# Patient Record
Sex: MALE | Race: WHITE
[De-identification: names, ages, dates, MRNs, and addresses within clinical notes are randomized per-mention and may not be internally consistent; named-entity substitution may affect disease eponyms.]

---

## 2019-08-23 ENCOUNTER — HOSPITAL ENCOUNTER (INPATIENT)
Dept: HOSPITAL 11 - JP.MS | Age: 59
LOS: 6 days | Discharge: NURSING, REHAB OR LONG-TERM CARE | DRG: 326 | End: 2019-08-29
Attending: SURGERY | Admitting: SURGERY
Payer: OTHER GOVERNMENT

## 2019-08-23 DIAGNOSIS — R22.2: ICD-10-CM

## 2019-08-23 DIAGNOSIS — K55.9: ICD-10-CM

## 2019-08-23 DIAGNOSIS — I25.10: ICD-10-CM

## 2019-08-23 DIAGNOSIS — Z88.0: ICD-10-CM

## 2019-08-23 DIAGNOSIS — E66.01: ICD-10-CM

## 2019-08-23 DIAGNOSIS — I10: ICD-10-CM

## 2019-08-23 DIAGNOSIS — Z88.1: ICD-10-CM

## 2019-08-23 DIAGNOSIS — Z95.0: ICD-10-CM

## 2019-08-23 DIAGNOSIS — K90.9: ICD-10-CM

## 2019-08-23 DIAGNOSIS — K29.70: ICD-10-CM

## 2019-08-23 DIAGNOSIS — F32.9: ICD-10-CM

## 2019-08-23 DIAGNOSIS — K56.51: ICD-10-CM

## 2019-08-23 DIAGNOSIS — K31.6: ICD-10-CM

## 2019-08-23 DIAGNOSIS — Z87.891: ICD-10-CM

## 2019-08-23 DIAGNOSIS — K65.1: ICD-10-CM

## 2019-08-23 DIAGNOSIS — M16.11: ICD-10-CM

## 2019-08-23 DIAGNOSIS — E78.5: ICD-10-CM

## 2019-08-23 DIAGNOSIS — Z86.73: ICD-10-CM

## 2019-08-23 DIAGNOSIS — K21.9: ICD-10-CM

## 2019-08-23 DIAGNOSIS — L02.211: ICD-10-CM

## 2019-08-23 DIAGNOSIS — Z90.49: ICD-10-CM

## 2019-08-23 DIAGNOSIS — Z95.1: ICD-10-CM

## 2019-08-23 DIAGNOSIS — Z98.890: ICD-10-CM

## 2019-08-23 DIAGNOSIS — Z79.899: ICD-10-CM

## 2019-08-23 DIAGNOSIS — Z95.5: ICD-10-CM

## 2019-08-23 DIAGNOSIS — G47.30: ICD-10-CM

## 2019-08-23 DIAGNOSIS — J45.909: ICD-10-CM

## 2019-08-23 DIAGNOSIS — Z98.84: ICD-10-CM

## 2019-08-23 DIAGNOSIS — G89.29: ICD-10-CM

## 2019-08-23 DIAGNOSIS — K28.5: Primary | ICD-10-CM

## 2019-08-23 PROCEDURE — C9113 INJ PANTOPRAZOLE SODIUM, VIA: HCPCS

## 2019-08-23 NOTE — CRLCR
INDICATION: Evaluate fistula post gastric bypass surgery



Comparison:



CT Abdomen/Pelvis 8/23/2019



Prelim: 



Oral contrast seen in distal esophagus, proximal stomach, likely the fundal 

pouch, and small bowel.  Small amount of contrast extends to the left 

lateral side of the stomach which may represent a fistula into the excluded 

stomach.



Agree with preliminary interpretation.  No additional findings.



Dictated by: Partha Diggs MD @ 08/28/2019 20:10:24



(Electronically Signed)

## 2019-08-24 PROCEDURE — 0DB68ZX EXCISION OF STOMACH, VIA NATURAL OR ARTIFICIAL OPENING ENDOSCOPIC, DIAGNOSTIC: ICD-10-PCS | Performed by: SURGERY

## 2019-08-24 RX ADMIN — MAGNESIUM SULFATE IN WATER SCH MLS/HR: 40 INJECTION, SOLUTION INTRAVENOUS at 22:12

## 2019-08-24 RX ADMIN — SODIUM CHLORIDE SCH MLS/HR: 9 INJECTION, SOLUTION INTRAVENOUS at 17:08

## 2019-08-24 RX ADMIN — MAGNESIUM SULFATE IN WATER SCH MLS/HR: 40 INJECTION, SOLUTION INTRAVENOUS at 11:25

## 2019-08-24 RX ADMIN — SODIUM CHLORIDE SCH MLS/HR: 9 INJECTION, SOLUTION INTRAVENOUS at 14:31

## 2019-08-24 RX ADMIN — SODIUM CHLORIDE SCH MLS/HR: 9 INJECTION, SOLUTION INTRAVENOUS at 11:24

## 2019-08-24 RX ADMIN — MAGNESIUM SULFATE IN WATER SCH MLS/HR: 40 INJECTION, SOLUTION INTRAVENOUS at 17:08

## 2019-08-25 PROCEDURE — 0FB20ZZ EXCISION OF LEFT LOBE LIVER, OPEN APPROACH: ICD-10-PCS | Performed by: SURGERY

## 2019-08-25 PROCEDURE — 0DBU0ZX EXCISION OF OMENTUM, OPEN APPROACH, DIAGNOSTIC: ICD-10-PCS | Performed by: SURGERY

## 2019-08-25 PROCEDURE — 0D150ZA BYPASS ESOPHAGUS TO JEJUNUM, OPEN APPROACH: ICD-10-PCS | Performed by: SURGERY

## 2019-08-25 PROCEDURE — 0DB60ZZ EXCISION OF STOMACH, OPEN APPROACH: ICD-10-PCS | Performed by: SURGERY

## 2019-08-25 PROCEDURE — 0D960ZZ DRAINAGE OF STOMACH, OPEN APPROACH: ICD-10-PCS | Performed by: SURGERY

## 2019-08-25 PROCEDURE — 3E0M05Z INTRODUCTION OF ADHESION BARRIER INTO PERITONEAL CAVITY, OPEN APPROACH: ICD-10-PCS | Performed by: SURGERY

## 2019-08-25 PROCEDURE — 0DNW0ZZ RELEASE PERITONEUM, OPEN APPROACH: ICD-10-PCS | Performed by: SURGERY

## 2019-08-25 PROCEDURE — 0DB80ZZ EXCISION OF SMALL INTESTINE, OPEN APPROACH: ICD-10-PCS | Performed by: SURGERY

## 2019-08-25 PROCEDURE — 0D9A0ZZ DRAINAGE OF JEJUNUM, OPEN APPROACH: ICD-10-PCS | Performed by: SURGERY

## 2019-08-25 RX ADMIN — MAGNESIUM SULFATE IN WATER SCH MLS/HR: 40 INJECTION, SOLUTION INTRAVENOUS at 17:21

## 2019-08-25 RX ADMIN — FENTANYL CITRATE ONE MCG: 50 INJECTION, SOLUTION INTRAMUSCULAR; INTRAVENOUS at 15:00

## 2019-08-25 RX ADMIN — MAGNESIUM SULFATE IN WATER SCH MLS/HR: 40 INJECTION, SOLUTION INTRAVENOUS at 22:34

## 2019-08-25 RX ADMIN — FENTANYL CITRATE ONE MCG: 50 INJECTION, SOLUTION INTRAMUSCULAR; INTRAVENOUS at 14:53

## 2019-08-25 RX ADMIN — MAGNESIUM SULFATE IN WATER SCH MLS/HR: 40 INJECTION, SOLUTION INTRAVENOUS at 04:15

## 2019-08-25 RX ADMIN — ACETAMINOPHEN SCH: 650 SOLUTION ORAL at 18:08

## 2019-08-25 RX ADMIN — LIDOCAINE HYDROCHLORIDE ANHYDROUS AND DEXTROSE MONOHYDRATE SCH MLS/HR: .4; 5 INJECTION, SOLUTION INTRAVENOUS at 14:54

## 2019-08-25 RX ADMIN — MAGNESIUM SULFATE IN WATER SCH: 40 INJECTION, SOLUTION INTRAVENOUS at 15:06

## 2019-08-26 RX ADMIN — PANTOPRAZOLE SODIUM SCH: 40 GRANULE, DELAYED RELEASE ORAL at 16:28

## 2019-08-26 RX ADMIN — DIVALPROEX SODIUM SCH MG: 250 TABLET, DELAYED RELEASE ORAL at 20:32

## 2019-08-26 RX ADMIN — MAGNESIUM SULFATE IN WATER SCH MLS/HR: 40 INJECTION, SOLUTION INTRAVENOUS at 05:14

## 2019-08-26 RX ADMIN — MAGNESIUM SULFATE IN WATER SCH MLS/HR: 40 INJECTION, SOLUTION INTRAVENOUS at 17:31

## 2019-08-26 RX ADMIN — GABAPENTIN SCH MG: 250 SOLUTION ORAL at 20:44

## 2019-08-26 RX ADMIN — LIDOCAINE HYDROCHLORIDE ANHYDROUS AND DEXTROSE MONOHYDRATE SCH MLS/HR: .4; 5 INJECTION, SOLUTION INTRAVENOUS at 10:07

## 2019-08-26 RX ADMIN — ACETAMINOPHEN SCH MG: 650 SOLUTION ORAL at 12:11

## 2019-08-26 RX ADMIN — GABAPENTIN SCH MG: 250 SOLUTION ORAL at 10:03

## 2019-08-26 RX ADMIN — MAGNESIUM SULFATE IN WATER SCH MLS/HR: 40 INJECTION, SOLUTION INTRAVENOUS at 10:12

## 2019-08-26 RX ADMIN — ACETAMINOPHEN SCH: 650 SOLUTION ORAL at 09:57

## 2019-08-26 RX ADMIN — GABAPENTIN SCH MG: 250 SOLUTION ORAL at 15:00

## 2019-08-26 RX ADMIN — SODIUM CHLORIDE, SODIUM LACTATE, POTASSIUM CHLORIDE, AND CALCIUM CHLORIDE SCH MLS/HR: .6; .31; .03; .02 INJECTION, SOLUTION INTRAVENOUS at 17:32

## 2019-08-26 RX ADMIN — ACETAMINOPHEN SCH MG: 650 SOLUTION ORAL at 17:32

## 2019-08-26 RX ADMIN — Medication SCH: at 10:03

## 2019-08-26 RX ADMIN — ACETAMINOPHEN SCH: 650 SOLUTION ORAL at 00:15

## 2019-08-26 RX ADMIN — MAGNESIUM SULFATE IN WATER SCH MLS/HR: 40 INJECTION, SOLUTION INTRAVENOUS at 23:57

## 2019-08-26 NOTE — PN
DATE OF SERVICE:  08/26/2019

 

SUBJECTIVE:  Morteza is postoperative day #1.  His pain has been controlled.  He has been quite

sleepy.  Upper GI this morning showed no evidence of leak of the oral administrated

contrast.  Vital signs stable.  Oral intake; sips of clear liquids.  Keen; 2775 mL out.  PAMELA

drain 1 put out 205 mL of a light red drainage and PAMELA drain put out 25 mL of a light pink

drainage.

 

REVIEW OF SYSTEMS:  Remainder of review of systems negative for any pertinent positives and

negatives.

 

OBJECTIVE:  GENERAL:  Morteza Campbell is a 59-year-old male who is postoperative day #1,

extremely sleepy.

VITAL SIGNS:  TPR is 98.1, 60, 16, blood pressure is 124/54.

HEENT:  Negative.

NECK:  Supple.

HEART:  Regular rate and rhythm.

LUNGS:  Clear.

ABDOMEN:  Dressing dry and intact.  PAMELA drains as above.

EXTREMITIES:  Without peripheral edema.  SCDs are on.

 

ASSESSMENT:  Exploratory laparotomy with:

1. Esophagogastrectomy entire stomach (with Judi-en-Y esophagojejunostomy).

2. En bloc partial left hepatic lobectomy.

3. Drainage of perigastric abscess.

4. Small-bowel resection.

5. Near complete omentectomy.

6. Placement of Interceed mesh.

7. Biopsy of peritoneal nodule on surface of omentum.

 

POSTOPERATIVE DIAGNOSES:

1. Perforated ulcer at the gastrojejunostomy adherent to left lobe of liver with contained

    perigastric abscess.

2. Gastrogastric fistula.

3. Peritoneal nodule over omentum.

4. Ischemic omentum status post gastrectomy.

 

Date of surgery:  08/25/2019.  Surgeon:  Elliott Joe MD.

 

PLAN:

1. Decrease IV to 100 mL per hour at 12 noon.

2. Advance diet to step 2 at noon.

3. Discontinue Keen catheter.

4. Medication restarted is Depakote 500 mg at bedtime.  Nursing staff or pharmacy will

    check with the patient if he is

    still taking that.

5. Communication order written, 3 med cups per hour or one every 20 minutes.

6. Good pulmonary toilet.

7. We will evaluate p.r.n. or in a.m.

 

 

 

 

Esther Jay PA-C

DD:  08/26/2019 08:01:08

DT:  08/26/2019 09:41:36

Job #:  [id number]

## 2019-08-26 NOTE — CRLCR
INDICATION:



Evaluate Judi-en-Y gastric bypass.



COMPARISON:



Upper GI study dated 23 August 2019.



FINDINGS:



Two views of the abdomen show contrast in the gastric pouch and small 

bowel. Small amount of contrast may be located in the excluded stomach.



No extravasation of contrast. Drainage catheters in the upper abdomen. No 

evidence of free intraperitoneal air. Degenerative changes of the spine.



IMPRESSION:



1. No evidence of extravasation/leak of the orally administered contrast. 



2. Small amount of contrast may be located in the excluded stomach. 



Consider CT scan for more complete evaluation.



Dictated by Nacho Terry MD @ 8/26/2019 6:03:38 AM



Dictated by: Nacho Terry MD @ 08/26/2019 06:04:06



(Electronically Signed)

## 2019-08-27 RX ADMIN — GABAPENTIN SCH MG: 250 SOLUTION ORAL at 20:53

## 2019-08-27 RX ADMIN — ACETAMINOPHEN SCH MG: 650 SOLUTION ORAL at 06:18

## 2019-08-27 RX ADMIN — PANTOPRAZOLE SODIUM SCH MG: 40 GRANULE, DELAYED RELEASE ORAL at 16:37

## 2019-08-27 RX ADMIN — GABAPENTIN SCH MG: 250 SOLUTION ORAL at 09:35

## 2019-08-27 RX ADMIN — ACETAMINOPHEN SCH: 650 SOLUTION ORAL at 23:33

## 2019-08-27 RX ADMIN — ACETAMINOPHEN SCH: 650 SOLUTION ORAL at 00:00

## 2019-08-27 RX ADMIN — SODIUM CHLORIDE, SODIUM LACTATE, POTASSIUM CHLORIDE, AND CALCIUM CHLORIDE SCH MLS/HR: .6; .31; .03; .02 INJECTION, SOLUTION INTRAVENOUS at 16:29

## 2019-08-27 RX ADMIN — Medication SCH: at 09:36

## 2019-08-27 RX ADMIN — ACETAMINOPHEN SCH MG: 650 SOLUTION ORAL at 17:24

## 2019-08-27 RX ADMIN — MAGNESIUM SULFATE IN WATER SCH MLS/HR: 40 INJECTION, SOLUTION INTRAVENOUS at 06:16

## 2019-08-27 RX ADMIN — GABAPENTIN SCH MG: 250 SOLUTION ORAL at 16:28

## 2019-08-27 RX ADMIN — ACETAMINOPHEN SCH MG: 650 SOLUTION ORAL at 11:18

## 2019-08-27 RX ADMIN — DIVALPROEX SODIUM SCH MG: 250 TABLET, DELAYED RELEASE ORAL at 20:50

## 2019-08-27 NOTE — PN
DATE OF SERVICE:  08/27/2019

 

SUBJECTIVE:  Morteza is alert and talkative.  He reports his pain on a pain scale of 1 to 10 of

4/10.  He has been up ambulating.  Afebrile.  Oral intake is 950.  Urine output was 2050

plus 2 missed voids.  Having no difficulty urinating after Keen catheter was removed.  PAMELA

drains have put out 120 and 55 of a pink serosanguineous drainage, amount 75 and 50

respectively.

 

REVIEW OF SYSTEMS:  Remainder of review of systems negative for any pertinent positives and

negatives.

 

OBJECTIVE:  GENERAL:  Morteza Campbell is a 59-year-old male.  He is alert, orientated, and

talkative.

VITAL SIGNS:  TPR is 96.8, 62, 14, and blood pressure 146/61.

HEENT:  Negative.

NECK:  Supple.

HEART:  Regular rate and rhythm.

LUNGS:  Clear.

ABDOMEN:  Aquacel dressing is off, came off during his shower.  Staples intact.  Incisions

covered with 4x4, and abdominal binder is on.

EXTREMITIES:  Without peripheral edema.

 

ASSESSMENT:  Exploratory laparotomy with,

1. Esophagogastrectomy, entire stomach (with Judi-en-Y esophagojejunostomy).

2. En bloc partial left hepatic lobectomy.

3. Drainage of perigastric abscess.

4. Small bowel resection.

5. Placement of Interceed mesh.

6. Biopsy of peritoneal nodule on surface of omentum.

Postoperative Diagnoses:

1. Perforated ulcer of the gastrojejunostomy adherent to left lobe of liver with contained

    perigastric abscess.

2. Gastrogastric fistula.

3. Peritoneal nodule over omentum.

4. Ischemic omentum, status post gastrectomy.

 

PLAN:

1. Discontinue PCA.

2. Discontinue continuous pulse ox.

3. IV decreased to TKO, 60 mL per hour.

4. Dilaudid 2 to 4 mg every 4 hours p.r.n. pain.

5. Consult with Discharge Planning in regard to home health care.

6. Good pulmonary toilet.

7. Walking encouraged.

8. We will evaluate p.r.n. or in a.m.

 

 

 

 

Esther Jay PA-C

DD:  08/27/2019 07:35:15

DT:  08/27/2019 11:28:14

Job #:  [id number]

## 2019-08-28 RX ADMIN — GABAPENTIN SCH MG: 250 SOLUTION ORAL at 22:22

## 2019-08-28 RX ADMIN — ACETAMINOPHEN SCH: 650 SOLUTION ORAL at 05:13

## 2019-08-28 RX ADMIN — OXYCODONE HYDROCHLORIDE AND ACETAMINOPHEN PRN TAB: 5; 325 TABLET ORAL at 13:21

## 2019-08-28 RX ADMIN — OXYCODONE HYDROCHLORIDE AND ACETAMINOPHEN PRN TAB: 5; 325 TABLET ORAL at 09:06

## 2019-08-28 RX ADMIN — OXYCODONE HYDROCHLORIDE AND ACETAMINOPHEN PRN TAB: 5; 325 TABLET ORAL at 17:43

## 2019-08-28 RX ADMIN — PANTOPRAZOLE SODIUM SCH MG: 40 GRANULE, DELAYED RELEASE ORAL at 16:50

## 2019-08-28 RX ADMIN — DIVALPROEX SODIUM SCH MG: 250 TABLET, DELAYED RELEASE ORAL at 22:22

## 2019-08-28 RX ADMIN — GABAPENTIN SCH MG: 250 SOLUTION ORAL at 13:26

## 2019-08-28 RX ADMIN — GABAPENTIN SCH MG: 250 SOLUTION ORAL at 10:27

## 2019-08-28 RX ADMIN — OXYCODONE HYDROCHLORIDE AND ACETAMINOPHEN PRN TAB: 5; 325 TABLET ORAL at 22:22

## 2019-08-28 NOTE — OR
DATE OF PROCEDURE:  08/24/2019

 

PREOPERATIVE DIAGNOSIS:  Upper abdominal pain with probable gastrogastric fistula.

 

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

cm) and associated gastrogastric fistula.

 

OPERATIVE PROCEDURE:  Upper GI endoscopy with biopsy of gastric pouch for CLOtest.

 

ANESTHESIA:  IV sedation.

 

INDICATION FOR PROCEDURE:  Please see earlier dictated progress note.  Potential risks

including bleeding and perforation were discussed, and the patient wishes to proceed.

 

DETAILS OF PROCEDURE:  The patient was taken to the operating room, placed in a left lateral

decubitus position.  IV sedation was administered, after which the upper GI endoscope was

passed orally through the length of the esophagus and into the gastric pouch.  Gastric pouch

was noted to be markedly edematous, and there appeared to be areas of some granulation

tissues around the area of the gastrogastric fistula.  The pouch was markedly enlarged

measuring 12 cm from the gastrojejunostomy to the esophagogastric junction mucosal line, and

the gastrogastric fistula was easily visualized with scope being passed into the remainder

of the bypassed stomach without difficulty.  The remainder of the examination was otherwise

unremarkable.  The gastrojejunostomy itself was opened and the Judi limb otherwise once few

centimeters from the gastrojejunostomy becoming normal in appearance.  Biopsies were

obtained from the gastric pouch, sent for CLOtest for H. pylori.  Minimal bleeding from the

biopsy sites was seen and the procedure then concluded.  The patient was taken to the

recovery room in satisfactory condition.  There were no evident complications.

 

 

 

 

Elliott Joe MD

DD:  08/27/2019 07:46:47

DT:  08/28/2019 12:58:26

Job #:  [id number]

## 2019-08-28 NOTE — HP
This is a 59-year-old admitted overnight after being transferred from the [facility] with worsening upper abdominal pain.  The patient underwent a laparoscopic Judi-en-

Y gastric bypass in [place], [place] in 2017 and then in 2018.  He had a perforated ulcer at

the gastrojejunostomy.  He now presents with worsening epigastric pain despite ongoing

medical management.  Workup in [facility] was consistent with a CAT scan, which

suggested a gastrogastric fistula in addition to quite large amount of edema around the area

of the gastric pouch and gastrojejunostomy.

 

The patient's past medical history includes history of cardiac disease, but this appears to

be stable.  He is status post stent placement as well as a pacemaker placement, who has had

hypertension, hyperlipidemia, and there is no history of type 2 diabetes mellitus.

Otherwise, his other history is as per the [facility] notes.

 

The plan will be to proceed with upper GI endoscopy today to evaluate the patient's anatomy,

and we will most likely tomorrow need to revise the gastrojejunostomy with minimum division

of the shunt.  I suspect if this was the case, we are probably dealing with a large gastric

pouch with a large amount of acid output and more of a resectional procedure will probably

be appropriate to reduce the amount of pouch size.  There may be enough inflammation that we

in fact may have to divide proximally and more at the level of the esophagus.  Anyway, this

would be discussed with the patient after the upper endoscopy has been completed, which will

be undertaken later on this morning.

 

 

 

 

Elliott Joe MD

DD:  08/27/2019 07:44:51

DT:  08/28/2019 13:01:38

Job #:  [id number]

## 2019-08-28 NOTE — PN
DATE OF SERVICE:  08/28/2019

 

SUBJECTIVE:  Morteza states that he would like to have a change in pain medication.  The

Dilaudid brings his pain from a 6 to a 4 on a pain scale of 1-10.  He is sleeping well, up,

ambulating.  Vital signs have been stable, tolerating a step 2 diet well.  Oral intake 1230,

output 1600.  PAMELA drains put out 50 and 35 of a light red drainage respectively.  He has no

other concerns or questions.

 

OBJECTIVE:  GENERAL:  Morteaz Campbell is a 59-year-old male.  He is alert and orientated.

VITAL SIGNS:  TPR 96.9, 62, 16.  Blood pressure 158/78.

HEENT:  Negative.

NECK:  Supple.

HEART:  Regular rate and rhythm.

LUNGS:  Clear.

ABDOMEN:  Staples intact.  He has an ABD over the staple line.  PAMELA drains are intact in left

lower quadrant and abdominal binder has been on.

EXTREMITIES:  Without peripheral edema.

 

ASSESSMENT:  Exploratory laparotomy with:

1. Esophagogastrectomy, entire stomach (with Judi-en-Y esophagojejunostomy).

2. En bloc partial left hepatic lobectomy.

3. Drainage of perigastric abscess.

4. Small bowel resection.

5. Placement of Interceed mesh.

6. Biopsy of peritoneal nodule on surface of omentum.

 

POSTOPERATIVE DIAGNOSES:

1. Perforated ulcer at the gastrojejunostomy adherent to left lobe of the liver with

    contained perigastric abscesses.

2. Gastrogastric fistula.

3. Peritoneal nodule over omentum.

4. Ischemic omentum, status post gastrectomy.

 

PLAN:

1. Milk of Magnesia 30 mL 1 time.

2. Dulcolax tabs 1 hour after Milk of Magnesia.

3. Saline lock IV.

4. Percocet 5/325 mg 1 to 2 every 4 hours p.r.n. pain.

5. Discontinue Dilaudid.

6. Discontinue Tylenol.

7. Check for prior authorization needed for narcotic pain medication.

8. Discharge planning.  To check on home care.  Referral was made to [facility]

    and awaiting return phone call.

9. Microbiology.  Abdominal abscess showed E. coli, viridans, streptococcus, which is

    sensitive to the meropenem that he is on.

10.Good pulmonary toilet.

11.We will [facility] p.r.n. [facility]

DD:  08/28/2019 07:45:35

DT:  08/28/2019 10:35:15

Job #:  [id number]

## 2019-08-29 RX ADMIN — GABAPENTIN SCH MG: 250 SOLUTION ORAL at 14:36

## 2019-08-29 RX ADMIN — OXYCODONE HYDROCHLORIDE AND ACETAMINOPHEN PRN TAB: 5; 325 TABLET ORAL at 15:04

## 2019-08-29 RX ADMIN — GABAPENTIN SCH MG: 250 SOLUTION ORAL at 08:28

## 2019-08-29 RX ADMIN — OXYCODONE HYDROCHLORIDE AND ACETAMINOPHEN PRN TAB: 5; 325 TABLET ORAL at 06:03

## 2019-08-29 RX ADMIN — PANTOPRAZOLE SODIUM SCH MG: 40 GRANULE, DELAYED RELEASE ORAL at 16:05

## 2019-08-29 RX ADMIN — OXYCODONE HYDROCHLORIDE AND ACETAMINOPHEN PRN TAB: 5; 325 TABLET ORAL at 02:47

## 2019-08-29 NOTE — PN
DATE OF SERVICE:  08/29/2019

 

SUBJECTIVE:  Morteza's vital signs have been stable.  His pain has been managed.  His pain

medication was increased where he is getting Percocet 1 to 2 tabs every 4 hours with

Vistaril 100 mg IM every 4 hours or the oral form of hydroxyzine, atarax 50 mg every 4 hours

that they have been trying to end with alternating it. It has managed his pain.  He has been

up, ambulating.  Oral intake 1230.  Urine output 1600.  PAMELA drains have put out 30 and 70

respectively of a light pink drainage, and he has had 2 bowel movements.

 

REVIEW OF SYSTEMS:  Remainder of review of systems negative for any pertinent positives and

negatives.

 

OBJECTIVE:  GENERAL:  Morteza Campbell is a 59-year-old male.  He is alert and orientated.

VITAL SIGNS:  TPR is 97.1, 65, 18.  Blood pressure 149/77.

HEENT:  Negative.

NECK:  Supple.

HEART:  Regular rate and rhythm.

LUNGS:  Clear.

ABDOMEN:  Staples intact.  Abdominal binder is on.

EXTREMITIES:  Without peripheral edema.

 

ASSESSMENT:

1. Exploratory laparotomy with esophagogastrectomy, entire stomach with Judi-en-Y

    esophagojejunostomy.

2. En bloc partial left hepatic lobectomy.

3. Drainage of perigastric abscess.

4. Small bowel resection.

5. Placement of Interceed mesh.

6. Biopsy of peritoneal nodule on surface of omentum.

 

PLAN:  Continue with same orders.  Discharge pending nursing home, rehabilitation center.

We will evaluate p.r.n. and plan discharge accordingly.

 

 

 

 

Esther Jay PA-C

DD:  08/29/2019 07:53:28

DT:  08/29/2019 10:29:15

Job #:  [id number]

## 2019-08-29 NOTE — DISCH
ADMISSION DIAGNOSES:

1. Partial small bowel obstruction.

2. Gastroesophageal reflux disease.

3. Status post Judi-en-Y gastric bypass surgery.

4. Unspecified surgical malabsorption.

5. Coronary artery disease, status post percutaneous coronary angioplasty.

6. Depression.

7. Hypertension.

8. Status post angioplasty with stent.

9. Cardiac dysrhythmia.

10.Hyperlipidemia.

11.Sleep apnea.

12.Asthma.

13.Cerebral artery occlusion with cerebral infarction (stroke).

14.History of gastric ulcer with perforation.

15.Pacemaker for bradycardia.

 

DISCHARGE DIAGNOSES:

1. Upper GI endoscopy with biopsies of gastric pouch for CLOtest for severe pouch

    gastritis with markedly enlarged gastric pouch and associated perigastric fistula.

    Date of surgery, 08/24/2019.

2. Exploratory laparotomy with:

    a.     Esophagogastrectomy, entire stomach (with Judi-en-Y esophagojejunostomy).

    b.     En bloc partial left hepatic lobectomy.

    c.     Drainage of perigastric abscess.

    d.     Small bowel resection.

    e.     Placement of Interceed mesh.

    f.     Biopsy of peritoneal nodule on surface of omentum.

 

POSTOPERATIVE DIAGNOSES:

1. Perforated ulcer at the gastrojejunostomy adherent to left lobe of the liver with

    contained perigastric abscess.

2. Gastrogastric fistula.

3. Peritoneal nodule over omentum.

4. Ischemic omentum, status post gastrectomy.

 

Date of surgery, 08/25/2019.  Surgeon, Elliott Joe M.D.

 

HISTORY:  Morteza Campbell is a 59-year-old male, who presented to [facility] after

being seen in the emergency room in [facility].  He was transferred with

increase in upper abdominal pain.  He had a laparoscopic Judi-en-Y gastric bypass surgery,

in [place], [place] in 2017 and then again in 2018.  He had a perforated ulcer at the

gastrojejunostomy.  He had a workup in [facility] with a CAT scan, which

suggested a gastrogastric fistula in addition to a large amount of edema around the area of

the gastric pouch and gastrojejunostomy.  He was transferred on 08/23/2019.  He had an upper

GI endoscopy on 08/24/2019 and surgery on 08/25/2019.  He had no operative complications.

On postop day #1, upper GI was normal.  His IV was decreased to 100 mL per hour.  He was

started on step 2 gastric bypass diet with no cereal at noon.  His Keen catheter was

discontinued.  Home medications were started.  On postop day #2, his PCA was discontinued.

He was started on oral pain medication.  Discharge Planning was consulted in regard to his

discharge.  On postop day #3, he was given milk of magnesia followed by Dulcolax tabs 1 hour

for bowel stimulation.  His Dilaudid was not quite adequate for pain coverage.  He was

changed to Percocet every 4 hours with Vistaril, hydroxyzine, altered every 2 hours.  On

08/29/2019, he was ready to be discharged.  Oral intake was adequate.  Vital signs were

stable.  Activity good, and he was having bowel movements.  Incision looks good.

 

REVIEW OF SYSTEMS:  He did negative for any pertinent positives and negatives.  He received

dietary instruction.

 

PHYSICAL EXAMINATION:

GENERAL:  Morteza is a 59-year-old male, alert, orientated.

VITAL SIGNS:  Height is 5 feet 8.11 inches, weight is 214 pounds, BMI is 32.  TPR; 97.1, 65,

18.  Blood pressure 149/77.

HEENT:  Negative.

NECK:  Supple.

HEART:  Regular rate and rhythm.

LUNGS:  Clear.

ABDOMEN:  [place] intact.  PAMELA drains will be removed prior to discharge, and abdominal

binder has been on.

EXTREMITIES:  Without peripheral edema.

 

DISPOSITION:  [facility]

 

CONDITION:  Stable and improving.

 

FOLLOWUP:  Followup appointment with Esther Jay PA-C, 09/06/2019, at 10:00 a.m.

 

HOME MEDICATIONS:

1. Percocet 5/325 mg 1 to 2 every 4 hours p.r.n. pain, #42.

2. Celebrex 200 mg oral daily, #14.

3. Hydroxyzine 50 mg oral q.4 hours alternating with Percocet for pain, #42.

 

He is to resume his home medications of:

1. Carvedilol, Coreg, 25 mg oral twice daily.

2. Divalproex sodium 500 mg oral at bedtime.

3. Doxazosin, Cardura, 8 mg at bedtime.

4. Enalapril maleate, Vasotec, 40 mg oral twice daily.

5. Proscar 5 mg oral daily.

6. Neurontin 600 mg oral 3 times a day.

7. Lisinopril 20 mg oral daily.

8. Antivert 12.5 mg oral daily p.r.n. dizziness.

9. Omeprazole 20 mg oral twice daily.

10.Zofran 4 mg every 8 hours p.r.n. nausea.

11.Potassium chloride 20 mEq oral daily.

12.Ranitidine 150 mg oral twice daily.

13.Sucralfate 1 g oral 4 times a day.

14.Amlodipine 5 mg oral daily.

15.Lipitor 40 mg oral at bedtime.

16.Ropinirole 2 mg oral twice daily.

 

He is to discontinue taking Norco, ascorbic acid, vitamin D3, and ferrous gluconate until

after first postop appointment.

 

DIET:  Step 2 gastric bypass diet with no cereal until 09/09/2019.  Drink 8 to 10 glasses of

water a day.

 

ACTIVITY:  No lifting greater than 10 pounds for 6 weeks.

 

OTHER ACTIVITY:  Walk at least 6 times daily, distance and time as tolerated.

 

DRIVING:  Do not drive for 1 week and while on pain medication.

 

SHOWER/BATHING:  May shower.  No tub bathing or swimming.

 

Notify provider if any fever, increased pain, nausea, or vomiting.  Keep site clean and dry.

Wear abdominal binder for 6 weeks.

 

SPECIAL INSTRUCTION:  Use incentive spirometer 10 times every hour while awake.

## 2019-08-29 NOTE — PN
DATE OF SERVICE:  08/24/2019

 

The patient underwent upper endoscopy earlier today, and then several hours later, I

discussed the findings with him.  Plan at this point will be to proceed with open

laparotomy, as his secondary procedure for perforation in 2018 was done with an open

approach, and he has a retrocolic Judi limb, all more or less mandating an open approach for

tomorrow's operation.  The patient has a strikingly enlarged gastric pouch and will need to

have this entire area resected, including the gastrogastric fistula.  We may end up having

to go higher up onto the pouch and more under the distalmost esophagus to get into a clean

plane for safe anastomosis, and otherwise whatever resectional procedure will be

reconstructed with Judi-en-Y esophagogastrojejunostomy.  Potential risks of the procedure

including bleeding, infection, leaks from various GI tract closures, problems with bowel

obstruction overtime were all reviewed, and the patient wishes to proceed.  These findings

and plan will be reviewed tomorrow morning again preoperatively with the patient.

 

 

 

 

Elliott Joe MD

DD:  08/27/2019 07:48:10

DT:  08/28/2019 13:21:36

Job #:  [id number]

## 2019-09-03 NOTE — OR
DATE OF PROCEDURE:  08/25/2019

 

SURGEON:  Elliott Joe MD

 

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

gastrojejunostomy.

 

POSTOPERATIVE DIAGNOSES:

1. Perforated ulcer at the gastrojejunostomy adherent to left lobe of liver and contained

    perigastric abscess.

2. Gastrogastric fistula.

3. Peritoneal nodule located on omentum.

4. Ischemic omentum, status post gastrectomy.

 

OPERATIVE PROCEDURES:  Exploratory laparotomy with:

1. Esophagogastrectomy with Judi-en-Y esophagojejunostomy (67805).

2. En bloc partial left hepatic lobectomy (41273).

3. Drainage of perigastric abscess (60081).

4. Small bowel resection (04746).

5. Near-complete omentectomy (05015).

6. Biopsy of peritoneal nodule and surface of omentum (12161).

7. Placement of Interceed mesh to limit recurrent adhesion formation between pelvic and

    abdominal wall and underlying viscera (74176).

 

ANESTHESIA:  General.

 

INDICATION FOR PROCEDURE:  The patient presents with worsening pain.  He is status post a

Judi-en-Y gastric bypass in [place], done in 2017.  He did then have a perforated ulcer at

the gastrojejunostomy treated with open procedure in 2018, now presents with a picture of

severe ulceration along with a gastrogastric fistula.  The plan is to proceed with a

resectional procedure.  His gastric pouch is quite large and this is likely contributing to

the ulcer problem in terms of large amount of parietal cell mass within the gastric pouch.

Plan is to proceed with resection, which may end up being an esophagogastrectomy versus a

partial proximal gastrectomy with Judi-en-Y reconstruction.  Potential risks of procedure

including bleeding, infection, leaks from various GI tract closures, problems with bowel

obstruction over time, as well as possibility of cardiopulmonary, septic, or hemorrhagic

complications leading to death were discussed, and the patient wishes to proceed.

 

DETAILS OF PROCEDURE:  The patient was taken to the operating room, and after general

endotracheal anesthesia was induced, a Keen catheter was inserted and the abdomen prepped

and draped.  An upper midline incision was made and carried down through the full-thickness

abdominal wall.  Upon entering the peritoneal cavity, fairly extensive adhesions of the

omentum, small bowel, and the anterior abdominal wall were encountered and these were taken

down with a combination of cautery and sharp dissection.  Inspection showed a large

inflammatory mass, roughly the size of a tennis ball located in the area of

gastrojejunostomy.  The patient was also noted to have a white nodule located in the surface

of the omentum.  This measured around 3 mm and was excised and sent as a separate specimen.

 

At this point, dissection began around the greater curvature of the stomach,  from

the omentum and then from short gastric vessels.  This then allowed eventually dissecting up

around the area of the intense inflammation.  It was fairly evident that the proximal

stomach at this point was too involved in the inflammation to safely serve as a point of

anastomosis.  Given this, the distal esophagus was encircled at this point and then divided

with a RAEGAN black load.

 

Further dissection from below eventually led into the perforation of the area immediately at

the gastrojejunostomy.  This contained, adjacent to it, a purulent collection, which was

then drained.  The ulcer at this point was extremely densely adherent to the liver and given

the possibility of something going in terms of ulcer, a portion of the left lobe of liver

was then resected using RAEGAN staples en bloc with the area of perforated ulcer.  Initially,

the distal transection line was noted to be within the antrum, which was with the stomach

being divided there with RAEGAN staples and specimen was then delivered from the field after

additional vascular attachments were undertaken.  Care was taken to avoid injury to the

splenic and hepatic arteries during this dissection.

 

The patient was noted to have only a small amount of antrum left, which would be problematic

in terms of producing high gastrin level.  Given this, the duodenum was encircled and

divided with a RAEGAN black load and the remaining gastric attachments of the stomach and

proximal-most duodenum were then divided and that specimen was sent as separate specimen as

well.

 

During the course of the dissection, the small bowel leading up to the previous

gastrojejunostomy was also divided.  As this was freed up, significant amount of this became

ischemic and additional small bowel resection was accomplished up to the level where the

vasculature of the bowel was satisfactory, and this was divided with RAEGAN stapler and the

underlying mesentery divided with RAEGAN staples as well.  At this point, the omentum was noted

to be largely ischemic with roughly 90% of the omentum appearing that way, and this was then

resected as well with REAGAN staples.

 

At this point, the Judi limb was brought up.  The limb lengths at this point were measured

out and the Judi limb was noted to be 70 cm, biliopancreatic limb 100 cm, and the common

limb 250 cm.  The patient's total alimentary length at this point would only have been

reduced by around 15 cm, that being the additional small bowel resection, so we felt that he

would likely tolerate the present configuration satisfactorily.

 

The anvil of a 28 mm EEA stapler was then placed into the divided end of the esophagus which

was then closed off and the anvil then brought out through the end of the divided esophagus.

Main body of the EEA stapler was then brought up to the Judi limb through an antecolic

approach, and after this was attached to the anvil, the esophagojejunostomy was

accomplished.  Upon removal of the stapler, double donuts of mucosa were noted within it.

Small bowel was closed off with a vascular staple line.  Esophagojejunostomy was then

reinforced with some 3-0 Vicryl seromuscular stitch along with fibrin sealant.  At this

point, no further problems were noted.  By definition, with removal of the entire stomach,

gastrogastric fistula would have been resected.  Two Colin-Sweet drains were placed, one

across the duodenal stump, which was also now reinforced with some fibrin sealant and some

small amount of remaining omentum and drain across the area of the esophagojejunostomy.  The

midline fascia was then approximated with #2 Vicryl stitch, the subcutaneous tissue with 2

layers of 3-0 Vicryl stitch, and the skin with staples.  One additional note is that the

Interceed mesh was then placed underneath the incision.  Two pieces of mesh were used, one

down toward the pelvis and one up into more of an abdominal area and underlying incision

prior to closure of the fascia.  Drains were sutured to the skin with some 3-0 Vicryl

stitch, and the patient was taken to the recovery room in satisfactory condition.  There

were no evident complications.

 

 

 

 

Elliott Joe MD

DD:  09/03/2019 07:25:33

DT:  09/03/2019 11:06:11

Job #:  [id number]

## 2019-09-06 NOTE — PN
DATE OF SERVICE:  09/06/2019

 

SUBJECTIVE:  Morteza is postop day #1.  He states his pain is controlled.  He is tolerating a

step 1 gastric bypass diet.  Vital signs have been stable.  He has been afebrile.  Oral

intake is 780, output is 1500.  He has 5 PAMELA drains.  PAMELA drain #1; 47, pink, clear.  PAMELA drain

#2; output is 305, it is a cloudy pink.  PAMELA drain #3 put out 250 and it is the same color as

#2, kind of a cloudy pink.  PAMELA drain #4 is clear, light pink, 130 mL.  PAMELA drain #5 is clear

pink, 40 mL.

 

REVIEW OF SYSTEMS:  Remainder of review of systems negative for any pertinent positives or

negatives.

 

OBJECTIVE:  GENERAL:  Morteza is a 59-year-old male.  He is alert, orientated.

VITAL SIGNS:  TPR is 96.6, 70, 16.  Blood pressure 133/50.

HEENT:  Negative.

NECK:  Supple.

HEART:  Regular rate and rhythm.

LUNGS:  Clear.

ABDOMEN:  Dressings dry and intact.  5 PAMELA drains intact as above.  Abdominal binder is on.

EXTREMITIES:  Without peripheral edema.

 

ASSESSMENT:  Exploratory laparotomy with drainage of intraabdominal and subphrenic

abscesses.  Date of surgery, 09/05/2019.  Surgeon, Elliott Joe M.D.

 

PLAN:

1. Magnesium 2 g IV q.6 hours x72 hours for magnesium of 1.5.

2. Check CBC, CMP, mag, phos in a.m.

3. Discontinue Keen catheter.

4. Zofran ODT 4 mg every 4 hours p.r.n. nausea.

5. Decrease D5LR to 100 mL/h.

6. Check CBC, CMP, phos, BNP in a.m.

7. Good pulmonary toilet.

8. We will evaluate p.r.n. or in a.m.

 

 

 

 

Esther Jay PA-C

DD:  09/06/2019 08:07:05

DT:  09/06/2019 08:51:40

Job #:  [id number]

## 2019-09-07 NOTE — PN
DATE OF SERVICE:  09/07/2019

 

SUBJECTIVE:  Morteza reports that he is starving.  His pain is controlled.  He has been up

ambulating.  He has no other concerns or questions.

 

OBJECTIVE:  GENERAL:  Morteza Campbell is a pleasant 59-year-old male.  He is alert and oriented,

resting comfortably in bed.

VITAL SIGNS:  TPR is 96.3, 60, 16.  Blood pressure is 115/47.

HEENT:  Negative.

NECK:  Supple.

HEART:  Regular rate and rhythm.

LUNGS:  Clear.

ABDOMEN:  Dressing dry and intact.  Abdominal binder is on.

EXTREMITIES:  Without peripheral edema.

 

ASSESSMENT:  Exploratory laparotomy with drainage of intraabdominal and subphrenic

abscesses.  Date of surgery 09/05/2019.  Surgeon, Elliott Joe MD.

 

PLAN:

1. KCl 60 mEq IV in 3 divided doses with lidocaine for potassium of 3.3.

2. IV change to D5LR at 100 mL/h.

3. Discontinue LR.

4. Probiotics p.o. 2 b.i.d.

5. Check CBC, CMP, mag, and phos in a.m.

6. Senna Plus 2 b.i.d. per patient request.

7. Step-2 gastric bypass diet with no cereal.

8. We will evaluate p.r.n. or in a.m.

 

 

 

 

Esther Jay PA-C

DD:  09/07/2019 08:11:48

DT:  09/07/2019 09:14:23

Job #:  [id number]

## 2019-09-08 NOTE — CRLCT
Clinical INDICATION:



Vomiting.



TECHNIQUE:



Axial intravenously infused CT cuts were performed from above diaphragm to 

below the ischial tuberosities. 145 mL of Isovue-300 has been injected 

intravenously.



COMPARISON:



CT scan 09/04/201908/23/2019. Upper GI series 09/06/2019. 



FINDINGS:



There has been previous gastric bypass surgery. There multiple drains 

within the abdomen. There is a collection of intraperitoneal air and fluid 

within the anterior abdomen with multiple drains in place that appear 

properly positioned. This collection measures up to 9.5 cm transversely, 

3.5 cm AP and 17.0 centimeters craniocaudally. The upper portion of this 

collection extends under the left hemidiaphragm. There is moderate fluid 

distention of the esophagus. Is mild fluid dilation of the proximal jejunal 

loops but no definite obstruction. There is small bowel mucosal edema (for 

example see image is 89 of series 2) that could represent an enteritis. 

There is a recent midline abdominal incision are containing air and fluid. 

The liver, spleen, pancreas, adrenals and kidneys appear normal. There are 

there are no enlarged retroperitoneal or mesenteric lymph nodes.



The urinary bladder is quite full. There is a small amount air in the 

urinary bladder likely from recent catheterization. The prostate gland 

seminal vesicles appear normal. There is streak artifact arising from a 

right hip arthroplasty. There is generalized body edema.



There are small bilateral pleural effusions more prominent on the left 

side. There is severe bilateral gynecomastia. There is atelectasis at both 

lung bases. There are pacemaker leads within the heart.



Impression :



1. There is a large intraperitoneal air and fluid collection within the 

anterior abdomen extending under the left hemidiaphragm with multiple 

drains in place that appear well-positioned. There is a recent anterior 

midline abdominal wall surgical incision.



2. Status post gastric bypass.



3. Fluid distention of the esophagus.



4. There is likely no bowel obstruction. There is mucosal edema of the mid 

small bowel loops and therefore there could be an enteritis present or 

possible submucosal of hemorrhage.



5. There is small amount air in the urinary bladder most likely from recent 

catheterization.



6. Generalized body edema.



7. Small bilateral pleural effusions larger on left side with dependent 

atelectasis more so on the left side.



8. Severe bilateral gynecomastia.



Please note that all CT scans at this facility use dose modulation, 

iterative reconstruction, and/or weight-based dosing when appropriate to 

reduce radiation dose to as low as reasonably achievable.



Dictated by Daniel Jade MD @ Sep  8 2019 10:48AM



Signed by Dr. Daniel Jade @ Sep  8 2019 11:09AM

## 2019-09-08 NOTE — PN
DATE OF SERVICE:  09/08/2019

 

SUBJECTIVE:  Morteza's pain has been controlled.  He did have a temp max at 0354 of 100.5, and

it was checked 2 hours later, was 99.3, otherwise has been afebrile.  He has been up

walking, a little bit reluctant to use his incentive spirometer.  Oral intake 860 and urine

output 950.  PAMELA drains have put out 10, 160, 70, 85, and 10 respectively.  PAMELA drains have

cleared up considerably.  The PAMELA drain 2 and 3 remain to be less clear than the other 3.

 

REVIEW OF SYSTEMS:  Remainder of review of systems negative for any pertinent positives and

negatives.

 

OBJECTIVE:  GENERAL:  Morteza Campbell is a 59-year-old male.

VITAL SIGNS:  TPR is 99.3.  The rest of the vitals are from 0354; 76, 70, 16.  Blood

pressure 104/42.

HEENT:  Negative.

NECK:  Supple.

HEART:  Regular rate and rhythm.

LUNGS:  Clear.

ABDOMEN:  Dressing is dry and intact.  Abdominal binder is on.  Five PAMELA drains as above.

EXTREMITIES:  Without peripheral edema.

 

ASSESSMENT:  Exploratory laparotomy with drainage of intraabdominal and subphrenic

abscesses.  Date of surgery 09/05/2019.  Surgeon, Elliott Joe MD.

 

PLAN:  Check schedule, have consent signed for delayed primary closure on Monday 09/09/2019.

Surgeon, Elliott Joe MD.  IV and local sedation, tap block, n.p.o. after midnight.  Check

CBC, CMP, phos, and BNP in a.m.  Good pulmonary toilet.  We will evaluate p.r.n. or in a.m.

 

 

 

 

Esther Jay PA-C

DD:  09/08/2019 07:46:47

DT:  09/08/2019 09:51:30

Job #:  [id number]

## 2019-09-09 NOTE — PN
DATE OF SERVICE:  09/09/2019

 

SUBJECTIVE:  Morteza is a 59-year-old male.  He does have a leak at the anastomosis.  PAMELA drains

were put to low intermittent suction.  He is n.p.o., unable to void and Keen catheter was

put back in.  Vital signs; his blood pressure did go up a little bit after he had 2 lactated

Ringer's boluses of 500 mL in the past 24 hours.  Morteza is alert, orientated, color pale.

Pain is well managed.

 

OBJECTIVE:  GENERAL:  Morteza is a 59-year-old male.  He is alert, orientated, as stated above.

Color is pale.

VITAL SIGNS:  TPR is 97.3, 60, 18, blood pressure 90/47.  Oral intake is zero.  IV intake

3667.  Urine output via Keen catheter is 725.  Prior to that, he had voided 375 for a total

of 2070.  PAMELA drain 1 put out 5 of a clear yellow drainage.  PMAELA drain 2 and 5 put out 485 and

440 of a cloudy brown sediment drainage and PAMELA drain 4 in 5 put out 20 and 20 respectively

of a clear drainage.

HEENT:  Negative.

NECK:  Supple.

HEART:  Regular rate and rhythm.

LUNGS:  Clear.

ABDOMEN:  PAMELA drains as above.  They are intact.  PAMELA drains 2 and 3; they were air-filled,

but after decompression, they did hold suction.  Dressing has some pink shadowing abdominal

binders on.

EXTREMITIES:  Without peripheral edema.

 

ASSESSMENT:

1. Leak at the anastomosis.

2. Exploratory laparotomy, drainage of intraabdominal and subphrenic abscesses.  Date of

    surgery, 09/05/2019.  Surgeon, Elliott Joe MD.

3. Hemoglobin 7.8, requiring 1 unit of packed red blood cells today.

4. Albumin 1.3.

 

PLAN:

1. Check CT with water-soluble oral contrast stat.

2. Albumin 50 g IV x3 days.

3. Cancel delayed primary closure.

4. 1 unit of packed red blood cells, urgent.

5. Lasix 10 mg IV after red blood cells are infused.

6. Check CBC, CMP, mag, phos, and BNP in a.m.

7. Will have triple lumen for TPN therapy pending results of CT scan today.

8. We will evaluate p.r.n. or in a.m.  Remain n.p.o. with ice chips and may have mouth

    swabs to keep mouth moist.  Continue to strip PAMELA drain every 2 hours and change bulbs

    p.r.n. to avoid occlusion.  Call with results of CT scan.

 

 

 

Esther Jay PA-C

DD:  09/09/2019 07:16:13

DT:  09/09/2019 07:32:03

Job #:  [id number]

## 2019-09-09 NOTE — CRLCR
Final Report: 



----- ADDENDUM -----

MODIFIED UPPER GI SERIES: 



CLINICAL INDICATION:

Status post surgery 2 weeks ago for bowel obstruction.



TECHNIQUE:

Three views of the abdomen, obtained following the administration of oral 
contrast.



COMPARISON:

08/26/2019.



FINDINGS:

Multiple abdominal surgical drains. Oral contrast opacifies the distal 
esophagus and abdominal bowel segments. Prominent gas-filled bowel segments in 
the left upper quadrant, exerting mass effect on the adjacent opacified bowel 
segments. Contrast pooling within a focally prominent left lower abdominal 
bowel segment. No definite gross extravasation visualized.

Dictated by Yazan Caldera MD @ Sep 6 2019 6:14AM

Signed by: Yazan Caldera MD @9/6/2019 6:20:59 AM

(Electronic Signature)
MTDD

## 2019-09-09 NOTE — CT
Abdomen Pelvis wo Cont: 9/9/2019 8:16 AM

 

INDICATION: History of gastric bypass procedure with anastomotic leak, follow-up

 

COMPARISON: 9/8/2019 CT.

 

TECHNIQUE: Axial images were obtained through the abdomen and pelvis without the

use of intravenous contrast. Coronal and sagittal reformats were obtained and

reviewed.

 

 

FINDINGS:

Lower thorax: Bilateral lower lobe opacities remain present which likely relate

to atelectasis. These appear unchanged. Small bilateral pleural effusions remain

present.

 

Liver: Unremarkable.

 

Gallbladder/biliary: Status post cholecystectomy.

 

Spleen: Unremarkable.

 

Adrenal glands: Unremarkable.

 

Kidneys: Punctate parenchymal calcifications. Contrast material present in the

renal collecting systems. No hydronephrosis.

 

Stomach: Postsurgical changes of gastric bypass again identified.

 

Duodenum and small bowel: Post surgical changes of gastric bypass again

identified. No evidence to suggest obstruction. Contrast material extends into

the colon.

 

Colon: Unremarkable.

 

Appendix: Not visualized

 

Pancreas: Atrophic.

 

Vascular structures: Atherosclerosis. Mild ectasia of the aorta and iliac

vessels..

 

Peritoneum: Previously noted complex air and fluid collection within the

anterior peritoneal cavity containing several drains remains present. This has

substantially decreased in size since the prior exam, with only a small amount

of residual material now present. However, contrast material is now identified

within the lumen of the collection which is new since the previous exam. Small

amount of ascites/intraloop fluid remains present.

 

Retroperitoneum: Prominent retroperitoneal lymph nodes are unchanged.

 

Reproductive structures: Within normal limits.

 

Urinary bladder: Bladder wall appears thickened, however the bladder is

nondistended. Small amount of contrast is present in the bladder lumen.

 

Pelvic sidewall: Unremarkable. No lymphadenopathy.

 

Inguinal regions: Prominent lymph nodes are unchanged.

 

Osseous structures: No acute findings. Changes of right hip arthroplasty again

identified. Moderately severe degenerative changes throughout the spine. Chronic

compression fracture of L1 again noted.

 

IMPRESSION:

1.  Changes of gastric bypass are again identified. The patient's

intraperitoneal air and fluid collection has substantially decreased in size

since the previous exam, with multiple surgical drains remaining in place.

However, there has been interval development of contrast material within the

lumen of the collection which may indicate a persistent anastomotic leak.

2.  Other ancillary findings as detailed above.

## 2019-09-10 NOTE — PN
DATE OF SERVICE:  09/10/2019

 

SUBJECTIVE:  Morteza reports his pain is controlled.  Vital signs have been stable.  He has

been afebrile.  Oral intake, n.p.o.  IV intake, including 1 unit of packed red blood cells,

4565.  Urine output 3255 via Keen catheter.  PAMELA drains have put out 0, 225, 225, 30, and

125 respectively.  Remainder of review of systems negative for any pertinent positives or

negatives.

 

LABORATORY DATA:  Hemoglobin 7, hematocrit 22.8, white count 13.2.  Creatinine is 0.6.  BNP

577.  Albumin is 1.7.

 

OBJECTIVE:  GENERAL:  Morteza Campbell is a 59-year-old male.  He is alert and orientated.  Color

pale.

VITAL SIGNS:  TPR 97.4, 60, 16; blood pressure 110/58.

HEENT:  Negative.

NECK:  Supple.

HEART:  Regular rate and rhythm.

LUNGS:  Clear.

ABDOMEN:  Dressings dry and intact.  PAMELA drain intact as above.  PAMELA drain 2 and 3 are putting

out a pan cloudy drainage.

EXTREMITIES:  Without peripheral edema.

 

ASSESSMENT:

1. Leak at the anastomosis.

2. Exploratory laparotomy, drainage of intraabdominal and subphrenic abscess.  Date of

    surgery, 09/05/2019.  Surgeon, Elliott Joe MD.

3. Hemoglobin 7.8, requiring 1 unit of packed red blood cells 09/09/2019.

4. Hemoglobin 7 on 09/10/2019, requiring 1 unit of packed red blood cells.

 

PLAN:

1. Will go to OR today.

2. Give 1 unit of packed red blood cells now.  Type and cross to stay 2 units ahead.

3. Physical therapy for postop weakness.  Orders to be written post surgical procedure.

    We will evaluate p.r.n. or in a.m.

 

 

 

 

Esther Jay PA-C

DD:  09/10/2019 07:31:05

DT:  09/10/2019 11:08:05

Job #:  [id number]

## 2019-09-11 NOTE — PN
DATE OF SERVICE:  09/11/2019

 

SUBJECTIVE:  Morteza states he is feeling better.  He did receive 1 unit of packed red blood

cells yesterday.  Hemoglobin is 7.9.  TPN was started, and he thinks that is making a

difference.  He has been up ambulating in the miramontes, afebrile.  Pain is controlled.

 

REVIEW OF SYSTEMS:  Remainder of review of systems negative for any pertinent positives and

negatives.

 

OBJECTIVE:  GENERAL:  Morteza Campbell is a 59-year-old male.

VITAL SIGNS:  TPR is 97.5, 70, 18.  Blood pressure 180/86.

HEENT:  Negative.

NECK:  Supple.

HEART:  Regular rate and rhythm.

LUNGS:  Clear.

ABDOMEN:  Dressings dry and intact.  He has 3 PAMELA drains.  PAMELA drain #2 put out 52; #3, 95;

and #4, 85 of a light tan drainage.  PAMELA drains 5 and 1 were removed during surgery

yesterday.  Oral intake remains to be sips of ice chips, and urine output via Keen catheter

5900.

EXTREMITIES:  Without peripheral edema.

 

ASSESSMENT:

1. Insertion of Gibson catheter, right subclavian, due to inadequate central venous

    access.

2. Dressing change under anesthesia for open wound insufficiently clean to close.  Date

    09/10/2019.  Surgeon, Elliott Joe MD.

3. Leak at the anastomosis.

4. Exploratory laparotomy, drainage of intraabdominal and subphrenic abscess.  Date of

    surgery 09/05/2019.  Surgeon, Elliott Joe MD.

5. Hemoglobin 7.8 requiring 1 unit of packed red blood cells 09/09/2019.

6. Hemoglobin 7 on 09/10/2019, requiring 1 unit of packed red blood cells.

 

PLAN:

1. Dressing change b.i.d. with dry 4x4s, ABD, and secure with tape.  May do one dressing

    change after shower.  Shower with dressing off, okay to let the water run down on the

    incision, pat dry, then redress.

2. May shower.

3. One unit of packed red blood cells today.

4. Same TPN rate and content.

5. Check CBC, CMP, mag, phos, and BNP in a.m.

6. Discharge planning for home health care with TPN and dressing changes b.i.d.  Plan

    discharge on Tuesday 09/17/2019.  Good pulmonary toilet.  We will evaluate p.r.n. or in

    a.m.

 

 

Esther Jay PA-C

DD:  09/11/2019 08:11:52

DT:  09/11/2019 08:55:19

Job #:  [id number]

## 2019-09-12 NOTE — OR
DATE OF PROCEDURE:  09/04/2019

 

SURGEON:  Elliott Joe MD

 

PREOPERATIVE DIAGNOSIS:  Intraabdominal abscess.

 

POSTOPERATIVE DIAGNOSIS:  Separate intraabdominal and left subphrenic abscesses.

 

PROCEDURE:  Exploratory laparotomy with:

1. Drainage of intraabdominal abscess located underneath the anterior abdominal wall and

    infrahepatic, left side of the abdomen (742753).

2. Drainage of separate left subphrenic abscess ([id number]).

 

ANESTHESIA:  General.

 

ASSISTANT:  Esther Jay PA-C.

 

INDICATIONS FOR PROCEDURE:  The patient is recently status post a complex operation

including esophagogastrectomy with associated total gastrectomy, drainage of perforated

ulcer and perigastric abscess eroding into the liver.  The patient has been doing well.  He

then presented with a picture of increasing abdominal pain and some low-grade fevers.  He

was seen at followup and subsequent CT scan showed multifocal abscesses located as

identified above.  Plan is to do exploratory laparotomy with drainage of those.  There is

possibility that he may have a leak at the esophagojejunostomy which would be drained

concurrently.  Potential risks including bleeding, infection, injury to underlying viscera

as well as possibility of cardiopulmonary, septic, or hemorrhagic complications leading to

death were discussed, and the patient wishes to proceed.

 

DETAILS OF PROCEDURE:  The patient was taken to the operating room and placed in supine

position.  After general endotracheal anesthesia was induced, a Keen catheter was inserted,

and the abdomen prepped and draped.  Previous upper midline incision was then opened and

carried down through the skin, subcutaneous tissue, and fascia.  Upon entering the fascia,

the abscess identified underneath the anterior abdominal wall extending up towards the

liver, was drained, cultures of this were obtained.  This had a milky purulent material

within it.  It was subsequently drained.  It appeared there was no direct connection to the

next area of the abscess as seen on CT.  The transverse colon and omentum were then

dissected around the anterior abdominal wall upward towards the left diaphragm and the 2nd

subphrenic abscess was then encountered.  This was then also cultured and drained and the

loculations broken up.  The area contained some brownish material which appeared to be some

broken down hemorrhage.  Both areas were irrigated with a large volume of meropenem and

Zyvox containing saline solution until all the areas became clear.  A total of 5 drains were

then placed, three in the area of the left upper quadrant, one from the right mid abdomen

across the duodenal stump and into the area of the esophagogastric junction as well, and one

additional drain placed down towards the pelvis to drain some more serous type fluid in that

area.  After completion of the irrigation, midline fascia was approximated with #2 Vicryl

stitch.  Skin and subcutaneous tissue were obviously at high risk for wound infection.

These were then packed open for possible delayed primary closure depending on the patient's

clinical course.  The patient was taken to the recovery room in satisfactory condition.

 

Physician assistant, Esther Jay, played an essential role in assisting in this case

helping to position the patient, retract structures as needed, as well as suturing and

cutting sutures when indicated.  Her presence improved patient safety and decreased the

operative time.

 

 

 

 

Elliott Joe MD

DD:  09/11/2019 20:40:17

DT:  09/12/2019 11:04:49

Job #:  [id number]

## 2019-09-13 NOTE — PN
DATE OF SERVICE:  09/04/2019

 

Yesterday, the patient was noted to have some increased drainage from the drains.  He was

felt to clinically probably have a leak from the esophagojejunostomy.  CT scan was obtained,

which showed quite a bit of air and some fluid, which appeared to be otherwise in the area

of the drains.  The drains were put to more of a continuous suction, and clinically, the

patient is feeling fairly good.  His white count is up a little bit, but he has been

afebrile with otherwise stable vital signs.  The CT scan obtained this morning with some

oral contrast showed some possible extravasation, although it is not clear.  That may be

some old dye, as he did have some water-soluble oral contrast, but the areas that were not

drained yesterday appear to be essentially well drained at this point per the radiologist.

Given this, we will continue the local drainage pattern and plan to proceed with Gibson

catheter insertion tomorrow to begin some TPN.  We can hopefully manage this as more or less

a solitary fistula until things heal up with the drains being in place.  We will leave all

the drains in place at present.  Some of these will be able to be removed as time goes by,

as they are not pretty much in the way of anything worrisome out.  One option later this

week might be to place a stent, if we are seeing a continued large amount of drain output,

but otherwise we will keep him n.p.o. except for few chips of ice chips and mouth swabs and

plan the TPN.  He is receiving 1 unit packed RBCs for hemoglobin of 7.8 and also some

albumin today.  Will start TPN going tomorrow, after the Gibson catheter is inserted.  We

will also plan to proceed with delayed primary closure of the wound at this point.  It is a

little bit late, but I think it is reasonable to give that a try, and if the wound does not

appear to be adequate for closure tomorrow, we will leave that open for secondary closure.

 

 

 

 

Elliott Joe MD

DD:  09/09/2019 18:57:47

DT:  09/12/2019 12:05:41

Job #:  [id number]

## 2019-09-13 NOTE — PN
DATE OF SERVICE:  09/13/2019

 

SUBJECTIVE:  Morteza's hemoglobin this morning was 9.8.  He had a bowel movement yesterday.

TPN is running without difficulty.  He is concerned he has hiccups intermittently.

 

REVIEW OF SYSTEMS:  Remainder of review of systems negative for any pertinent positives and

negatives.

 

OBJECTIVE:  GENERAL:  Morteza Campbell is a 59-year-old male.  He is alert and orientated.  Color

pale.

VITAL SIGNS:  TPR 96.6, 59, and 16; blood pressure 136/63.

HEENT:  Negative.

NECK:  Supple.

HEART:  Regular rate and rhythm.

LUNGS:  Clear.

ABDOMEN:  Dressings dry and intact.  PAMELA drains have drained a tan drainage of 170, 120, and

75 respectively.

EXTREMITIES:  Without peripheral edema.

 

ASSESSMENT:

1. Insertion of Gibson catheter, right subclavian, due to inadequate venous access.

2. Dressing change under anesthesia for open wound insufficiently cleaned to close.  Date

    10/09/2019.  Surgeon, Elliott Joe MD.

3. Leak at anastomosis.

4. Exploratory laparotomy, drainage of intraabdominal and subphrenic abscess.  Date of

    surgery 09/05/2019.  Surgeon, Elliott Joe MD.

5. Hemoglobin of 7.8 on 09/09/2019, requiring 1 unit of packed red blood cells.

6. Hemoglobin of 7 on 09/10/2019, requiring 1 unit of packed red blood cells.

7. Hemoglobin of 8.6, requiring 1 unit of packed red blood cells on 09/12/2019.

8. Magnesium of 1.7, requiring magnesium supplement.

 

PLAN:

1. Continue TPN, same rate and content.

2. Discontinue Azactam.

3. Discontinue meropenem.

4. Reglan 10 mg q.6 hours scheduled IV.

5. Check CBC, CMP, phos, and BNP.

6. Dressing changes.  Continue b.i.d.  Order clarified that was written in chart.

    Dressing of abdominal wound has not been properly dressed per orders.

7. Good pulmonary toilet.

8. We will evaluate p.r.n. or in a.m.

 

 

Esther Jay PA-C

DD:  09/13/2019 15:28:20

DT:  09/13/2019 17:54:36

Job #:  [id number]

## 2019-09-13 NOTE — PN
DATE OF SERVICE:  09/12/2019

 

SUBJECTIVE:  Mai Keen was taken out early this morning.  He has already voided.  Reports

pain is controlled.  Vital signs have been stable.  He reported heartburn that feels like he

needs to burp, is not passing much flatus.  He is unsure of his last bowel movements.  He

states he has passed flatus a couple of times.  PAMELA drains are intact.  PAMELA drain 2, 3, and 4

drained out 88, 58 and 85 respectively.  Drain 2 and 3 are of white tannish color.

 

REVIEW OF SYSTEMS:  Remainder of review of systems negative for any pertinent positives and

negatives.

 

OBJECTIVE:  GENERAL:  Morteza Campbell is a 59-year-old male.

VITAL SIGNS:  TPR is 98.3, 62, 16, blood pressure 147/74.

HEENT:  Negative.

NECK:  Supple.

HEART:  Regular rate and rhythm.

LUNGS:  Clear.

ABDOMEN:  Dressings dry and intact.  PAMELA drains as above.  An abdominal binder is on.

EXTREMITIES:  Without peripheral edema.

 

LABORATORY DATA:  Hemoglobin 8.6, potassium 4.1, glucose 110, and magnesium is 1.7.  BNP

1,167.

 

TPN running without any difficulty.

 

ASSESSMENT:

1. Insertion of Gibson catheter, right subclavian due to inadequate venous access.

2. Dressing change under anesthesia for open wound insufficiently clean to close, date

    09/10/2019.  Surgeon, Elliott Joe.

3. Leak at the anastomosis.

4. Exploratory laparotomy, drainage of intraabdominal and subphrenic abscess.  Date of

    surgery, 09/05/2019.  Surgeon, Elliott Joe MD.

5. Hemoglobin 7.8, requiring 1 unit of packed red blood cells 09/09/2019.

6. Hemoglobin 7.0 on 09/10/2019, requiring 1 unit of packed red blood cells.

7. Hemoglobin 8.6, requiring 1 unit of packed red blood cells on 09/12/2019.  Magnesium

    1.7 requiring magnesium supplement.

 

PLAN:  Tums 1000 mg every 2 hours p.r.n. heartburn.  Upper GI with small-bowel follow-

through without air on Monday 09/16/2019, radiology present.  Status post bariatric

protocol.  Continue same TPN rate and content.  CBC, CMP, mag, phos, BNP in a.m.  Colace 100

mg b.i.d., Lasix 20 mg IV push now.  Transfuse 1 unit of packed red blood cells.  Give Lasix

20 mg IV after blood is transfused.  Colace 100 mg b.i.d., magnesium 2 g IV q.6 hours x72

hours, give Dulcolax 2 tabs b.i.d.  We will evaluate p.r.n. or in a.m.

 

 

 

 

Esther Jay PA-C

DD:  09/12/2019 08:48:43

DT:  09/12/2019 12:08:52

Job #:  [id number]

## 2019-09-16 NOTE — CR
[facility] [employer]

 

CLINICAL HISTORY: Anastomotic leak

 

FINDINGS: Plantar film shows surgical drains in the left upper quadrant and

epigastric region. Patient has had previous epigastric and upper abdominal

surgery.

 

IMPRESSION: Patient swallowed the water-soluble contrast. After the second

swallow contrast is seen within the epigastric drainage tubes. There is a

collection of contrast extending into the perioperative space.

 

Impression: Persistent leak at the operative site near the gastroesophageal

junction region

## 2019-09-16 NOTE — PN
DATE OF SERVICE:  09/15/2019

 

The patient has been afebrile with stable vital signs.  Mood is a little bit depressed, but

he is up moving fairly well.  He has been getting a little bit more steady on his feet.

Otherwise, PAMELA drainage continues to be quite low and thinner.  We will obtain an upper GI x-

ray tomorrow morning with radiologist using water-soluble contract to evaluate the leak at

the esophagojejunostomy anastomosis.  We will also re-culture JPs 2 and 3, which are in the

area of the leak and, if he can tolerate the TPN, we will begin adding some fat emulsion to

the TPN regimen every other day.

 

 

 

 

Elliott Joe MD

DD:  09/15/2019 07:52:29

DT:  09/15/2019 12:26:46

Job #:  [id number]

## 2019-09-16 NOTE — PN
DATE OF SERVICE:  09/14/2019

 

The patient has been afebrile with stable vital signs.  He did get around 240 mL of water in

and the PAMELA output is quite minimal, becoming thicker, as it is possible the leak is

stopping.  We will continue the TPN through the weekend here and get an upper GI x-ray with

water soluble contrast with the radiologist on Monday.  If that shows no leak, we will try

to begin advancing the diet to more of a full clear liquid diet with some protein

supplements.  If there is still a leak and it is quite significant in appearance, we might

consider placing an endoscopic stent on Tuesday.  Otherwise, continue TPN and try to

maximize his activity level.

 

 

 

 

Elliott Joe MD

DD:  09/14/2019 07:57:06

DT:  09/14/2019 13:31:12

Job #:  [id number]

## 2019-09-16 NOTE — PN
DATE OF SERVICE:  09/16/2019

 

SUBJECTIVE:  Morteza remains on TPN.  He is having an upper GI this morning to see if the leak

has sealed.  Vital signs otherwise have been stable.  He has been more depressed over the

weekend, refusing to walk, use SIS.

 

REVIEW OF SYSTEMS:  Remainder of review of systems negative for any pertinent positives and

negatives.

 

OBJECTIVE:  GENERAL:  Morteza Campbell is a 59-year-old male.  He is alert and orientated, lying

in bed.

VITAL SIGNS:  TPR is 97.8, 61, 14.  Blood pressure 113/46.

HEENT:  Negative.

NECK:  Supple.

HEART:  Regular rate and rhythm.

LUNGS:  Clear.

ABDOMEN:  Dressing is dry and intact.  He has 3 PAMELA drains.  They are draining a tan-colored

drainage, which is on changed, but it has more sediment to it.  Drainage is 70, 60, and 75

respectively.  Urine output 4250.  Oral intake 300, mainly ice chips with medication.

EXTREMITIES:  Without peripheral edema.

 

ASSESSMENT:

1. Insertion of Gibson catheter, right subclavian due to inadequate venous access.

2. Dressing change under anesthesia for open wound insufficiently cleaned to close.  Date

    of surgery 09/09/2019.  Surgeon, Elliott Joe MD.

3. Leak anastomosis.

4. Exploratory laparotomy, drainage of intraabdominal and subphrenic abscess.  Date of

    surgery 09/05/2019.  Surgeon, Elliott Joe MD.

5. TPN nutrition.

 

PLAN:

1. Continue same TPN rate and content.

2. Call Elliott Joe MD, with results of upper GI.

3. Zoloft 50 mg p.o. daily.

4. Encouraged to walk at least 6 times daily, sit up in the chair.

5. Note to staff:  At this point, I do not want him to drink very much due to leak in the

    anastomosis.

6. We will evaluate p.r.n. or in a.m.

 

 

 

 

Esther Jay PA-C

DD:  09/16/2019 10:41:30

DT:  09/16/2019 11:17:19

Job #:  [id number]

## 2019-09-17 NOTE — CRLCR
INDICATION:



check stent placement.



COMPARISON:



CT abdomen and pelvis September 8, 2019.



TECHNIQUE:



Portable KUB including the lower chest.



FINDINGS:



A stent identified extending from the lower thorax into the upper abdomen. 

Multiple surgical drains identified in the upper abdomen. Cardiac pacer in 

place. Infiltrates lower lobe left lung with associated left-sided pleural 

effusion.



IMPRESSION:



Stent identified extending from the lower thorax to the upper abdomen.



Dictated by Dagoberto Wilde MD @ Sep 17 2019 12:28PM



Signed by Dr. Dagoberto Wilde @ Sep 17 2019 12:32PM

## 2019-09-18 NOTE — PN
DATE OF SERVICE:  09/18/2019

 

SUBJECTIVE:  Morteza will be having a CT scan this morning he had 2 esophageal stents put in

yesterday.  Does report increased heartburn, will spit up some thick saliva.  PAMELA drains have

decreased to 5 mL each of a light tan drainage remainder.

 

REVIEW OF SYSTEMS:  Negative for any pertinent positives and negatives.

 

OBJECTIVE:  GENERAL:  Morteza Campbell is a 59-year-old male.

VITAL SIGNS:  TPR is 97.9, 67, 18.  Blood pressure 142/64.

HEENT:  Negative.

NECK:  Supple.

HEART:  Regular rate and rhythm.

LUNGS:  Clear.

ABDOMEN:  Dressings dry and intact.  Abdominal binder is on.

EXTREMITIES:  Without peripheral edema.

 

ASSESSMENT:

1. Esophagogastroduodenoscopy with esophageal stent placement on 09/17/2019.  Insertion of

    Gibson catheter, right subclavian, due to inadequate access.

2. Dressing change under anesthesia for open wound, insufficiently cleaned and closed.

    Date of surgery 09/09/2019.  Surgeon, Elliott Joe MD.

3. Leak at the anastomosis.

4. Exploratory laparotomy, drainage of intraabdominal and subphrenic abscess.  Date of

    surgery 09/05/2019.  Surgeon, Elliott Joe MD.

5. TPN nutrition.

 

PLAN:

1. Continue same TPN rate and content to check CT of abdomen.  The 1st CT should be no

    contrast.  The 2nd CT should drink 50 mL of water-soluble contrast, then check 2nd CT.

2. Consult with pharmacy regarding sublingual pain medication.  Check CBC, CMP, mag, and

    phos in a.m.  Page Dr. Jeo with results of the CT scan.

 

 

 

 

Esther Jay PA-C

DD:  09/18/2019 09:00:47

DT:  09/18/2019 12:02:52

Job #:  [id number]

## 2019-09-18 NOTE — PN
DATE OF SERVICE:  09/17/2019

 

The patient was noted to have some persistent leakage from the esophagojejunal anastomosis.

Given this, the plan will be to proceed with stent placement today.  Otherwise, he has been

afebrile with stable vital signs, clinically stable.  He is a little bit more conversant and

less depressed-appearing today.

 

His labs show no major problems, and we will continue the TPN.  If the stent placement

appears to be successful, we will give him some sips of clear liquids.

 

 

 

 

Elliott Joe MD

DD:  09/17/2019 12:16:12

DT:  09/18/2019 11:33:31

Job #:  [id number]

## 2019-09-18 NOTE — CRLCT
INDICATION:



Evaluate esophageal stents and any further leaks.



TECHNIQUE:



CT of the abdomen without intravenous contrast. Patient was scanned twice, 

first without oral contrast and then with oral contrast. Patient had 

difficulty tolerating oral contrast and vomited most of it. Coronal and 

sagittal reconstructions. 



COMPARISON:



CT abdomen/pelvis 09/09/2019.



FINDINGS:



Postoperative changes Judi-en-Y gastric bypass. Small bowel anastomoses in 

the mid abdomen and left abdomen. No bowel dilation. 



Interval placement of stent within the mid and distal esophagus which 

extends into the Judi limb. There is an air fluid level within the stent. 

Persistent gas and fluid collection in the anterior left abdomen which 

contains 2 drains (series 2, image 59), measuring 3.5 x 7.3 cm. This 

appears to be walled off. Additional drain extends from the anterior 

abdomen superiorly to the left upper quadrant near the anastomosis. On the 

2nd phase following oral contrast ingestion, there is extravasated contrast 

seen within the anterior abdominal fluid collection compatible with leak. 

Additionally, there is extraluminal enteric contrast extending superiorly 

from this fluid collection along the course of 1 of the drains toward the 

left upper quadrant which is outside of the stent (series 5 images 51-36). 



Cholecystectomy. The unenhanced liver, spleen, pancreas, kidneys, and 

adrenal glands are normal in appearance. Parenchymal calcification in the 

right kidney. Aortoiliac vascular calcifications. Scattered small amount of 

free fluid throughout the abdomen. Chronic L1 compression fracture and 

benign bone island. Degenerative changes of the spine. Decreased size of 

the now tiny left pleural effusion with associated compressive atelectasis. 

Resolved right pleural effusion. Linear atelectasis or scarring in the 

right lung base. Pacemaker leads in the heart. 



IMPRESSION:



1. Interval placement of stent within the mid and distal esophagus which 

extend into the Judi limb. 



2. Persistent gas and fluid collection in the left anterior abdomen 

containing drains. 



3. Following ingestion of oral contrast, there is extravasation of contrast 

into the anterior abdominal fluid collection as well as linear contrast 

outside of the stent extending superiorly toward the left upper quadrant 

along the course of 1 of the drains.



Please note that all CT scans at this facility use dose modulation, 

iterative reconstruction, and/or weight-based dosing when appropriate to 

reduce radiation dose to as low as reasonably achievable.



Dictated by Esther Fountain MD @ Sep 18 2019 10:25AM



Signed by Dr. Esther Fountain @ Sep 18 2019 10:44AM

## 2019-09-18 NOTE — OR
DATE OF PROCEDURE:  09/10/2019

 

SURGEON:  Elliott Joe MD

 

PREOPERATIVE DIAGNOSES:

1. Indication for central venous access.

2. Open abdominal wound.

 

POSTOPERATIVE DIAGNOSES:

1. Indication for central venous access.

2. Open wound insufficiently clean, to undergo delayed primary closure.

 

OPERATIVE PROCEDURES:

1. Insertion of double-lumen Gibson catheter via right subclavian vein approach (18051).

2. Dressing change under anesthesia (89027).

 

ANESTHESIA:  Local plus IV sedation.

 

INDICATION FOR PROCEDURE:  At this point, the patient continues to have a leak from the

esophagojejunal anastomosis and will be undergoing some IV hyperalimentation.  Given this, a

central line will be placed.  A Gibson catheter will be placed due to the possible need for

this needing to be relatively long term, over a period of weeks.  Also, we will examine the

wound, and delayed primary closure of the abdominal incision will be undertaken if it

appeared to be appropriate.  Potential risks of the procedure including bleeding, infection,

and injury to the lung and/or vasculature during the Gibson catheter insertion were

reviewed, and the patient wishes to proceed.

 

DETAILS OF PROCEDURE:  The patient was taken to the operating room and placed in a supine

position.  IV sedation was administered, after which the upper chest and neck areas were

prepped and draped.  The right subclavian area was then anesthetized with 1% lidocaine mixed

with Marcaine and the subclavian vein cannulated.  From there, a guidewire manipulated into

the superior vena cava.  Some additional local was injected, and the Gibson catheter was

then tunneled over roughly 4 fingerbreadths below the original puncture site up to the

puncture site and cut such that the tip would lie in the superior vena cava and right atrial

junction, which was then delivered without difficulty using an additional peel-away catheter

system.  The Gibson catheter was then sutured at the skin level with 3-0 nylon stitch and

the original puncture site closed with a 4-0 Vicryl subcuticular stitch.  Steri-Strips were

applied at that location.  Good in and outflow was noted, and the ports were flushed with

heparinized saline.

 

The patient's abdominal dressing was then taken down.  This appeared to be involving quite a

bit of purulence.  At this point, we decided not to proceed with a delayed primary closure,

simply change the dressing, and begin dressing change, looking for a secondary closure.

 

 

Elliott Joe MD

DD:  09/18/2019 05:59:18

DT:  09/18/2019 11:17:06

Job #:  [id number]

## 2019-09-19 NOTE — PN
DATE OF SERVICE:  09/19/2019

 

SUBJECTIVE:  Morteza's upper GI was reviewed.  He continues to have a small leak at the

anastomosis.  He states that his pain is controlled.  He has been up ambulating with a

walker.  Continues TPN.

 

REVIEW OF SYSTEMS:  Remainder of review of systems negative for any pertinent positives and

negatives.

 

LABORATORY DATA:  This morning hemoglobin 10.3 and sodium is 136.

 

OBJECTIVE:  General:  Morteza Campbell is a 59-year-old male.  He is alert, more talkative today.

Vital Signs:  TPR is 96.7, 60, 16.  Blood pressure 156/78.

HEENT:  Negative.

Neck:  Supple.

Heart:  Regular rate and rhythm.

Lungs:  Clear.

Abdomen:  Dressings dry and intact.  PAMELA drains over the past 24 hours put out a tan drainage

of 115 and 50 respectively.

Extremities:  Without peripheral edema.

 

ASSESSMENT:

1. Exploratory laparotomy with:

    a.     Drainage of intraabdominal abscess located underneath the anterior abdominal

     wall and intrahepatic left side of abdomen.

    b.     Drainage of separate left subphrenic abscess for intraabdominal abscess.  Date

     of surgery 09/04/2019.  Surgeon, Elliott Joe MD.

2. Insertion of double-lumen Gibson catheter via right subclavian vein approach.

3. Dressing change under general anesthesia for indication of central venous access and

    open wound insufficiently clean, to undergo delayed primary closure.

4. Esophagogastroduodenoscopy with esophageal stent placement on 09/17/2019.

5. TPN therapy.

6. Low hemoglobin requiring 1 unit of packed red blood cells on 09/09/2019, 09/10/2019,

    and 09/12/2019 for hemoglobin of 7.8, 7, 8.6 respectively.

 

PLAN:

1. Continue sips of clear liquid only.

2. Continue same TPN rate and content.

3. Magnesium 2 g q.6 hours x72 hours IV.

4. Discontinue PCA and continuous pulse oximetry.

5. Oxycodone 10 mg q.4 hours p.r.n. pain.

6. Consult with Discharge Planning in regard to evaluation with nursing staff for changing

    to swing bed status.

7. We will evaluate p.r.n. or in a.m.

 

 

 

 

Esther Jay PA-C

DD:  09/19/2019 07:53:39

DT:  09/19/2019 09:27:49

Job #:  [id number]

## 2019-09-20 NOTE — OR
DATE OF PROCEDURE:  09/17/2019

 

SURGEON:  Elliott Joe MD

 

PREOPERATIVE DIAGNOSIS:  Persistent leak at the esophagojejunostomy.

 

POSTOPERATIVE DIAGNOSIS:  Persistent leak at the esophagojejunostomy.

 

OPERATIVE PROCEDURES:

1. Upper GI endoscopy with:

    a.     Injection of fibrin sealant into the submucosal plane of the esophagojejunostomy

     leak (40797).

    b.     Placement of covered endoscopic stents across esophagojejunostomy x2 ([id number]).

 

ANESTHESIA:  General.

 

INDICATION FOR PROCEDURE:  This is a 59-year-old presenting with ongoing leak from

esophagojejunostomy.  Plan is to proceed with upper GI endoscopy with injection of fibrin

sealant into the leak, followed by placement of covered endoscopic stent.  Potential risks

of the procedure including bleeding, infection, and further dislodgement of the anastomosis

were reviewed, and the patient wishes to proceed.

 

DETAILS TO PROCEDURE:  The patient was taken to the operating room and placed in a supine

position.  After general endotracheal anesthesia was induced, he was converted to a left

lateral decubitus position.  The upper GI endoscope was then passed orally through the

esophagus and into the area of the esophagojejunostomy.  The leak could be seen at the

anastomosis on its left lateral aspect.  A Colin-Sweet drain was also visible just outside

of the leak.  To provide some distance from that then, the drain, which in this case was

located in the right upper quadrant abdominal wall, was pulled back such that the drain is

no longer visible.  8 mL of fibrin sealant were then injected into the level of the

submucosal plane, filling the area of leak.

 

A guidewire was then passed into the Judi limb and the gastroscope was then withdrawn,

leaving wire in place.  The initial stent placed was an Endomaxx covered stent with a length

of 120 mm, outer diameter at the ends of 28 mm, and inner diameter 23 mm.  The level of the

leak was marked down from the patient's abdominal wall with a hemostat to allow centering of

the stent at the level of the leak.  The stent was then deployed and appeared to be in good

position.  The gastroscope was then reinserted, which confirmed coverage of the leak.  We

then placed a second stent, which did have the same outer and inner diameter, although was

100 cm long, and the goal here was to deposit the stent somewhat more distally, but where

the outer 28 mm flange would be over the leak, providing some additional occlusive pressure

at that

level.  The stent was delivered as described without difficulty, and the wire was removed

and the gastroscope was reinserted, confirming adequate location of the stents, and there

appeared to be good sealing of the stents above and

below the level of the stents.  At that point, the patient was taken to the recovery room in

satisfactory condition.  There were no evident complications.

 

 

 

 

Elliott Joe MD

DD:  09/19/2019 16:10:45

DT:  09/20/2019 00:31:30

Job #:  [id number]

## 2019-09-21 NOTE — PN
DATE OF SERVICE:  09/21/2019

 

SUBJECTIVE:  Morteza was able to get outside yesterday.  Vital signs have been stable.  Oral

intake 360.  He is taking liquids only with his medications.  PAMELA drains are less thick and

put out 30 and 170 respectively.  He has had 2 bouts of nausea unusually after his

medications.

 

REVIEW OF SYSTEMS:  Remainder of review of systems negative for any pertinent positives and

negatives.

 

OBJECTIVE:  GENERAL:  Morteza Campbell is a 59-year-old male.  He is resting in bed.

VITAL SIGNS:  TPR 97.7, 61, 16.  Blood pressure is 121/53.

HEENT:  Negative.

NECK:  Supple.

HEART:  Regular rate and rhythm.

LUNGS:  Clear.

ABDOMEN:  Dressings dry and intact.  Per nursing, the open incision looks good.  It has been

packed twice daily and abdominal binder is on.

EXTREMITIES:  Without peripheral edema.

 

ASSESSMENT:

1. Exploratory laparotomy with:

    a.     Drainage of intraabdominal abscess located underneath anterior abdominal wall,

     and intrahepatic left side of abdomen.

    b.     Drainage of separate left subphrenic abscess for intraabdominal abscess.  Date

     of surgery, 09/04/2019.  Surgeon, Elliott Joe MD.

2. Insertion of double lumen Gibson catheter via right subclavian vein approach.

3. Dressing change under general anesthesia for indication of central vein access and open

    wound insufficiently cleaned, undergo delayed primary closure.

4. Esophagogastroduodenoscopy with esophageal stent placement, 09/17/2019.

5. TPN therapy.

6. Low hemoglobin requiring 1 unit of packed red blood cells on 09/09/2019, 09/10/2019,

    and 09/12/2019 for hemoglobins of 7.8, 7.8, and 8.6 respectively.

 

PLAN:  Continue same TPN rate and content.  Communication order written for Morteza to sit up 1

hour after he gets his oral medications.  We will evaluate p.r.n. or in a.m.

 

 

 

 

Esther Jay PA-C

DD:  09/21/2019 08:43:14

DT:  09/21/2019 10:09:02

Job #:  [id number]

## 2019-09-22 NOTE — PN
DATE OF SERVICE:  09/22/2019

 

SUBJECTIVE:  Morteza has increase in depression.  He has refused to go outside yesterday.  He

is refusing his medication.  He did take one of the Depakote at night, but refuses senna S,

gabapentin, Cardura, Coreg, Proscar, Requip, and Colace last evening.  PAMELA drains put out 40

and 220 mL of light tan drainage.  Oral intake 120.  He did have a bowel movement yesterday.

In talking to nursing staff, he is refusing his medications because he does not want to sit

up after taking the medications, but if he does not sit up, then he gets heartburn.  He did

have a temperature max of 100.3 yesterday.

 

OBJECTIVE:  GENERAL:  Morteza Campbell is a 59-year-old male.

VITAL SIGNS:  TPR is 99.9, 62, 18.  Blood pressure 117/50.

HEENT:  Negative.

NECK:  Supple.

HEART:  Regular rate and rhythm.

LUNGS:  Clear.

ABDOMEN:  Dressing dry and intact.  PAMELA drains as above.

EXTREMITIES:  Without peripheral edema.

 

ASSESSMENT:

1. Exploratory laparotomy with:

    a.     Drainage of intraabdominal abscess located underneath anterior abdominal wall

     and intrahepatic left side of abdomen.

    b.     Drainage of separate left subphrenic abscess, for intraabdominal abscess.  Date

     of surgery, 09/04/2019.  Surgeon, Elliott Joe M.D.

2. Insertion of double lumen Gibson catheter via right subclavian vein approach.

3. Dressing change under general anesthesia for indication of central vein access and open

    wound insufficiency clean, undergo delayed primary closure.

4. EGD with esophageal stent placement on 09/17/2019.

5. TPN therapy.

6. Low hemoglobin requiring 1 unit of packed red blood cells on 09/09/2019, 09/10/2019,

    and 09/12/2019, for hemoglobins of 7.8, 7.8, and 8.6 respectively.

7. Increased depression.

 

PLAN:

1. Continue same TPN rate and content.

2. Check CBC, CMP, mag, and phos in a.m.

3. Watch the patient's intake of medication, give as much encouragement as possible.

4. Increase Zoloft to 75 mg daily.

5. We will evaluate p.r.n. or in a.m.

 

 

Esther Jay PA-C

DD:  09/22/2019 08:33:59

DT:  09/22/2019 10:01:40

Job #:  [id number]

## 2019-09-23 NOTE — PN
DATE OF SERVICE:  09/23/2019

 

SUBJECTIVE:  Morteza had a much better day yesterday.  Vital signs remained stable.  He did

walk down to the lobby and walked once to the nurse's desk.  He has a different chair in his

room, which is more comfortable for him.  Oral intake was 300, but he did not take all of

his medications except the Colace.  No emesis.  No reports of heartburn or nausea.  PAMELA drain

put out 50 and PAMELA drain 4 put out 100.  He did have 3 bowel movements.

 

REVIEW OF SYSTEMS:  Remainder of review of systems negative for any pertinent positives and

negatives.

 

OBJECTIVE:  GENERAL:  Morteza Campbell is a 59-year-old male.

VITAL SIGNS:  TPR is 99.8, 63, 16.  Blood pressure 142/61.

HEENT:  Negative.

NECK:  Supple.

HEART:  Regular rate and rhythm.

LUNGS:  Clear.

ABDOMEN:  Dressings dry and intact.  Abdominal binder is on.

EXTREMITIES:  Without peripheral edema.

 

ASSESSMENT:

1. Exploratory laparotomy with:

    a.     Drainage of intraabdominal abscess located underneath anterior abdominal wall

     and intrahepatic left side of abdomen.

    b.     Drainage of separate left subphrenic abscess, for intraabdominal abscess.  Date

     of surgery, 09/04/2019.  Surgeon, Elliott Joe M.D.

2. Insertion of double lumen Gibson catheter via right subclavian vein approach.

3. Dressing change under general anesthesia for indication of central vein access and open

    wound insufficiency clean, undergo delayed primary closure.

4. EGD with esophageal stent placement on 09/17/2019.

5. TPN therapy.

6. Low hemoglobin requiring 1 unit of packed red blood cells on 09/09/2019, 09/10/2019,

    and 09/12/2019, for hemoglobins of 7.8, 7.8, and 8.6 respectively.

7. Increased depression.

 

PLAN:

1. Continue same TPN rate and content.

2. Magnesium 2 g IV q.6 hours for 72 hours.

3. We will evaluate p.r.n. or in a.m.

 

 

 

 

Esther Jay PA-C

DD:  09/23/2019 08:10:28

DT:  09/23/2019 09:18:20

Job #:  [id number]

## 2019-09-24 NOTE — PN
DATE OF SERVICE:  09/24/2019

 

SUBJECTIVE:  Morteza is quite talkative, looks like he is feeling a lot better 
today.  He

reports he is not sleeping.  He was given melatonin 3 mg last night, which was 
clearly not

enough.  He states he slept about 3 hours straight and then has been sleeping 
on an off.

Vital signs have been stable.  Oral intake remains to be just sips with his 
medications.  He

had 100 mL recorded in.  TPN is running without difficulty.  Urine output 1875.
  PAMELA drain #2

put out 65 and #4 put out 195 mL of a tan-colored drainage.  Last bowel 
movement 09/24/2019

(today).

 

REVIEW OF SYSTEMS:  Remainder of review of systems negative for any pertinent 
positives and

negatives.

 

OBJECTIVE:  GENERAL:  Morteza Campbell is a 59-year-old male.  He is alert and 
orientated, quite

talkative as stated.

VITAL SIGNS:  TPR is 97.8, 67, 14.  Blood pressure 138/61.

HEENT:  Negative.

NECK:  Supple.

HEART:  Regular rate and rhythm.

LUNGS:  Clear.

ABDOMEN:  Dressing is dry and intact.  Incision is healing well per nursing 
staff.

Abdominal binder is on.  PAMELA drains as stated above.

EXTREMITIES:  Without peripheral edema.

 

ASSESSMENT:

1. Exploratory laparotomy with:

    a.     Drainage of intraabdominal abscess located underneath anterior 
abdominal wall and

intrahepatic left side of

            abdomen.

    b.     Drainage of separate left subphrenic abscess, for intraabdominal 
abscess.  Date

of surgery, 09/04/2019.

            Surgeon, Elliott Joe M.D.

2. Insertion of double lumen Gibson catheter via right subclavian vein 
approach.

3. Dressing change under general anesthesia for indication of central vein 
access and open

wound insufficiently clean,

    undergo delayed primary closure.

4. EGD with esophageal stent placement on 09/17/2019.

5. TPN therapy.

6. Low hemoglobin requiring 1 unit of packed red blood cells on 09/09/2019, 09/
10/2019, and

09/12/2019, for

    hemoglobins of 7.8, 7.8, and 8.6 respectively.

7. Increased depression.

 

PLAN:  Continue same TPN rate and content.  Check CBC, CMP, and phos in a.m.  
Increase

melatonin to 9 mg at bedtime.  Increase Zoloft to 100 mg p.o. daily.  We will 
evaluate

p.r.n. or in a.m.

 

 

 

 

THUAN RhoadesC

DD:  09/24/2019 08:42:07

DT:  09/24/2019 11:02:44

Job #:  [id number]

[id number]

## 2019-09-25 NOTE — PN
DATE OF SERVICE:  09/25/2019

 

SUBJECTIVE:  Morteza is feeling much better today.  He is talkative.  States that he has slept

better on melatonin 9 mg and is feeling better with the increase of Zoloft.  He has several

questions regarding swing bed.  Nursing staff is trying to move him to a different room, and

he is happy about that.  Vital signs have been stable.  Oral intake 220 mL.  His PAMELA drains

put out 110 and 230 respectively of a light tan drainage.  Pain medication was changed from

oxycodone to Dilaudid.  He feels like his pain is better controlled.  He was able to get up

and walk more yesterday.  Hemoglobin 10.2.  Albumin is 2.1.

 

REVIEW OF SYSTEMS:  Remainder of review of systems negative for any pertinent positives and

negatives.

 

OBJECTIVE:  GENERAL:  Morteza Campbell is a 59-year-old male, alert, orientated.

VITAL SIGNS:  TPR is 96.2, 68, 16.  Blood pressure 130/59.

HEENT:  Negative.

NECK:  Supple.

HEART:  Regular rate and rhythm.

LUNGS:  Clear.

ABDOMEN:  Dressing dry and intact, continuing per nursing staff to heal well, and PAMELA drains

as above.

EXTREMITIES:  Without peripheral edema.

 

ASSESSMENT:

1. Exploratory laparotomy with:

    a.     Drainage of intraabdominal abscess located underneath anterior abdominal wall and

intrahepatic left side of

            abdomen.

    b.     Drainage of separate left subphrenic abscess, for intraabdominal abscess.  Date

of surgery, 09/04/2019.

            Surgeon, Elliott Joe M.D.

2. Insertion of double lumen Gibson catheter via right subclavian vein approach.

3. Dressing change under general anesthesia for indication of central vein access and open

wound insufficiently clean,

    undergo delayed primary closure.

4. EGD with esophageal stent placement on 09/17/2019.

5. TPN therapy.

6. Low hemoglobin requiring 1 unit of packed red blood cells on 09/09/2019, 09/10/2019,

and09/12/2019, for

    hemoglobins of 7.8, 7.8, and 8.6 respectively.

7. Increased depression.

 

PLAN:

1. Continue same TPN rate and content.

2. Albumin 50 g IV daily x3 days.

3. Accurate intake and output.

4. We will evaluate p.r.n. or in a.m.

 

 

 

 

Esther Jay PA-C

DD:  09/25/2019 08:12:42

DT:  09/25/2019 09:23:23

Job #:  [id number]

## 2019-09-26 NOTE — PN
DATE OF SERVICE:  09/26/2019

 

OBJECTIVE:  GENERAL:  Morteza is a 59-year-old male.  He is alert and orientated.

VITAL SIGNS:  TPR is 97, 67, 16, and blood pressure 117/54.

HEENT:  Negative.

NECK:  Supple.

HEART:  Regular rate and rhythm.

LUNGS:  Clear.

ABDOMEN:  Dressings dry and intact.  Abdominal binder is on.  PAMELA drains have put out, PAMELA #2

is 140 mL and it is tan, PAMELA #4 is 100 mL and it is tannish pink, really no change.

EXTREMITIES:  Without peripheral edema.

 

ASSESSMENT:

1. Exploratory laparotomy with:

    a.     Drainage of intraabdominal abscess located underneath anterior abdominal wall and

intrahepatic left side of

            abdomen.

    b.     Drainage of separate left subphrenic abscess, for intraabdominal abscess.  Date

of surgery, 09/04/2019.

            Surgeon, Elliott Joe M.D.

2. Insertion of double lumen Gibson catheter via right subclavian vein approach.

3. Dressing change under general anesthesia for indication of central vein access and open

wound insufficiently clean,

    undergo delayed primary closure.

4. EGD with esophageal stent placement on 09/17/2019.

5. TPN therapy.

6. Low hemoglobin requiring 1 unit of packed red blood cells on 09/09/2019, 09/10/2019,

and09/12/2019, for

    hemoglobins of 7.8, 7.8, and 8.6 respectively.

7. Increased depression.

 

PLAN:

1. Continue same TPN rate and content.

2. Check CBC, CMP, mag, and phos in a.m.

3. Morteza is getting woken up several times during the night.  At this time, vital signs

    have been stable, so decrease checking vital signs to every 8 hours and p.r.n.  Change

    heparin dose to 07:00 a.m. and 07:00 p.m.  Discontinue daily weights and try to group

    medications together to avoid a disrupted sleep during the night.  He does get his pain

    medications every 3 hours, so the Reglan could be adjusted an hour before or an hour

    after that.  Continue to encourage good ambulation.

4. We will evaluate p.r.n. or in a.m.

 

 

 

 

Esther Jay PA-C

DD:  09/26/2019 07:55:28

DT:  09/26/2019 11:34:59

## 2019-09-27 NOTE — PN
DATE OF SERVICE:  09/27/2019

 

SUBJECTIVE:  Morteza had 45 out of his PAMELA drain #2, 110 out of PAMELA drain #4, oral intake 220.

Vital signs have been stable.  Pain is controlled.

 

REVIEW OF SYSTEMS:  Remainder of review of systems negative for any pertinent positives and

negatives.

 

OBJECTIVE:  GENERAL:  Morteza Campbell is a 59-year-old male.  He is oriented.

VITAL SIGNS:  TPR 95.9, 60, 12.  Blood pressure 130/57.

HEENT:  Negative.

NECK:  Supple.

HEART:  Regular rate and rhythm.

LUNGS:  Clear.

ABDOMEN:  Dressing dry and intact.  PAMELA drains were examined, along with Elliott Joe MD.

EXTREMITIES:  Without peripheral edema.

 

ASSESSMENT:

1. Exploratory laparotomy with:

    a.     Drainage of intraabdominal abscess located underneath anterior abdominal wall and

intrahepatic left side of

            abdomen.

    b.     Drainage of separate left subphrenic abscess, for intraabdominal abscess.  Date

of surgery, 09/04/2019.

            Surgeon, Elliott Joe M.D.

2. Insertion of double lumen Gibson catheter via right subclavian vein approach.

3. Dressing change under general anesthesia for indication of central vein access and open

wound insufficiently clean,

    undergo delayed primary closure.

4. EGD with esophageal stent placement on 09/17/2019.

5. TPN therapy.

6. Low hemoglobin requiring 1 unit of packed red blood cells on 09/09/2019, 09/10/2019, and

09/12/2019, for

    hemoglobins of 7.8, 7.8, and 8.6 respectively.

7. Increased depression.

 

PLAN:

1. Check amylase on PAMELA drain #2.

2. Check amylase on PAMELA drain #4.  Change bulbs before checking the amylase.

3. Continue same TPN rate and content.

4. Viscous lidocaine in a med cup.  Fill the med cup with 1/4 viscous lidocaine and then

    the rest water.  May use this every 4 hours p.r.n. heartburn.  Plan scheduled upper GI

    with Radiology present to evaluate if a leak.

5. We will evaluate p.r.n. or in a.m.

 

 

 

 

Esther Jay PA-C

DD:  09/27/2019 08:16:01

DT:  09/27/2019 09:07:02

Job #:  [id number]

## 2019-09-30 NOTE — CR
UGI wo KUB

 

HISTORY: Assess leak at Esophagojejunostomy

 

FINDINGS: Patient swallowed water-soluble contrast during fluoroscopy. There was

immediate extravasation of contrast outside the proximal portion of the

esophageal stent. This is a extended to the left into the region of the

diaphragmatic felisa. This eventually filled the 2 upper drains. Delayed images

showed contrast passing through the gastric remanent the into the proximal small

intestine

 

IMPRESSION: Persistent leak in the distal esophagus the from the proximal

portion of the stent extending to the left. This is a shortly when into the

upper abdominal drains

 

No evidence of obstruction

## 2019-09-30 NOTE — PN
DATE OF SERVICE:  09/30/2019

 

The patient has been afebrile with stable vital signs.  No major change has occurred

overnight.  Looking at the drains, the output has decreased quite a bit but is still fairly

liquid and viscous-like, and I suspect he may still have a leak.  An upper GI x-ray will be

obtained this morning.  If there is a leak, I think we will remove the existing stents and

manipulate the drain somewhat to get a little further distraction from the site of the leak

and then replace the stent at that point as well, and if the leak has stopped, then we can

begin slowly advancing the drains back and can concurrently start liquid diet with some blue

dye.

 

 

 

 

Elliott Joe MD

DD:  09/30/2019 07:03:19

DT:  09/30/2019 10:06:45

Job #:  [id number]

## 2019-09-30 NOTE — PN
DATE OF SERVICE:  09/29/2019

 

The patient has been afebrile with stable vital signs.  His output through the drains has

decreased quite a bit and I suspect that we may be getting close to having the leak stopped,

and we will get an upper GI x-ray tomorrow with water soluble contrast to assess that issue.

Otherwise, continue present TPN and we will recheck some labs tomorrow.  Pain control

appeared to be a little bit better with the 4 mg Dilaudid dose.

 

 

 

 

Elliott Joe MD

DD:  09/29/2019 07:38:24

DT:  09/30/2019 10:56:53

Job #:  [id number]

## 2019-09-30 NOTE — PN
DATE OF SERVICE:  09/28/2019

 

The patient has been afebrile with stable vital signs.   Mood appears to be a little bit

tired today.  He is moved to a room where there is good morning sunlight, which should be

helpful.  Pain control is a little bit marginal and will bump the Dilaudid dose up somewhat.

Most of the pain has to do with his chronic back issues.  Otherwise, continue the present

TPN, maximize activity, and work with pulmonary toilet.  PAMELA drainage has become quite thick.

We will check an upper GI x-ray on Monday to see to what extent there is any persistent

leak.  He is doing better with viscous Xylocaine in terms of the discomfort in the

esophageal area.

 

 

 

 

Elliott Joe MD

DD:  09/28/2019 07:24:34

DT:  09/30/2019 10:20:43

Job #:  [id number]

## 2019-10-02 NOTE — PN
DATE OF SERVICE:  10/02/2019

 

SUBJECTIVE:  Morteza is postop 1 day from an EGD and stent placement with fibrin sealant over

the area of leak.  His drains have been advanced, and there is no bulb at the end; they are

draining.  He did develop some hiccoughing and was given 1 dose of Haldol, and it went away.

Vital signs have been stable.

 

REVIEW OF SYSTEMS:  Remainder of review of systems negative for any pertinent positives and

negatives.

 

OBJECTIVE:  GENERAL:  Morteza Campbell is a 59-year-old male, alert and orientated.

VITAL SIGNS:  TPR 96.2, 62, 18.  Blood pressure 140/73.

HEENT:  Negative.

NECK:  Supple.

HEART:  Regular rate and rhythm.

LUNGS:  Clear.

ABDOMEN:  Dressing is dry and intact.  Abdominal binder is on.

EXTREMITIES:  Without peripheral edema.

 

ASSESSMENT:

1.      Upper endoscopy with:

         a.      Application of fibrin sealant into fistula.

         b.      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  Date,

10/01/2019.  Surgeon, Elliott Joe MD.

2.      Exploratory laparotomy with:

     a.     Drainage of intraabdominal abscess located underneath anterior abdominal wall

and intrahepatic left side of

              abdomen.

      b.     Drainage of separate left subphrenic abscess, for intraabdominal abscess.

    Date of surgery, 09/04/2019.

              Surgeon, Elliott Joe M.D.

3        Insertion of double lumen Gibson catheter via right subclavian vein approach.

4.       Dressing change under general anesthesia for indication of central vein access and

open wound insufficiently

          clean, undergo delayed primary closure.

5.       EGD with esophageal stent placement on 09/17/2019.

6.       TPN therapy.

7.       Low hemoglobin requiring 1 unit of packed red blood cells on 09/09/2019,

09/10/2019, and 09/12/2019, for

          hemoglobins of 7.8, 7.8, and 8.6 respectively.

8.       Increased depression.

 

PLAN:

1. Continue same TPN rate and content.

2. Check CBC, CMP, phosphorus in a.m.

3. Good pulmonary toilet.

4. We will evaluate p.r.n. or in a.m.

 

 

 

 

Esther Jay PA-C

DD:  10/02/2019 08:39:02

DT:  10/02/2019 09:41:38

Job #:  [id number]

## 2019-10-03 NOTE — PN
DATE OF SERVICE:  10/03/2019

 

SUBJECTIVE:  Morteza really has had no change.  TPN is running without any 
difficulty.  He did

have a bowel movement.  He has refused physical therapy.  According to nursing 
staff, he

will walk just very short distances and request the wheelchair.  Depression and 
motivation

are increasing.

 

REVIEW OF SYSTEMS:  Remainder of review of systems negative for any pertinent 
positives and

negatives.

 

OBJECTIVE:  GENERAL:  Morteza Campbell is a 59-year-old male.

VITAL SIGNS:  TPR last recorded 10/02/2019 at 2200; 96.5, 67, 16, blood 
pressure 135/59.

HEENT:  Negative.

NECK:  Supple.

HEART:  Regular rate and rhythm.

LUNGS:  Clear.

ABDOMEN:  Dressings dry and intact.  Abdominal binder is on.

EXTREMITIES:  Without peripheral edema.



ASSESSMENT: 

1.      Upper endoscopy with:

         a.      Application of fibrin sealant into fistula.

         b.      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  Date
, 10/01/2019.  

                  Surgeon, Elliott Joe MD.

2.      Exploratory laparotomy with:

     a.     Drainage of intraabdominal abscess located underneath anterior 
abdominal wall

             and intrahepatic left side of abdomen.

      b.     Drainage of separate left subphrenic abscess, for intraabdominal 
abscess.

              Date of surgery, 09/04/2019.  Surgeon, Elliott Joe M.D.

3        Insertion of double lumen Gibson catheter via right subclavian vein 
approach.

4.       Dressing change under general anesthesia for indication of central 
vein access and

          open wound insufficiently clean, undergo delayed primary closure.

5.       EGD with esophageal stent placement on 09/17/2019.

6.       TPN therapy.

7.       Low hemoglobin requiring 1 unit of packed red blood cells on 09/09/2019
, 09/10/2019, 

          and 09/12/2019, for hemoglobins of 7.8, 7.8, and 8.6 respectively.

8.       Increased depression.

  

PLAN:

1. Check abdominal CT without pelvis on 10/04/2019 at 0400.  The first CT scan 
should be

    with no contrast, the second CT scan should be with 15 mL of water-soluble 
contrast and

    to drink that and then to immediately check this CT scan.

2. Discontinue Dilaudid.

3. Morphine liquid 10 mL q.4 hours p.r.n. pain.

4. Discontinue IV Benadryl.

 

Discussion with discharge planning regarding swing bed.  He would get vital 
signs daily,

nursing assessments daily.  He would need to walk independently, shower 
independently, and

dress himself.  He currently now is refusing physical therapy and staff nursing 
staff feels

it would be unsafe to change him to swing bed status.  On Morteza's concern that 
he needs to go

to the bank and pay some bills, discharge planning is working on getting in 
contact with the

bank and helping him with these concerns.  Discussion with Morteza regarding what 
he needs to

accomplish and work toward as far as getting to a swing bed and eventually home.

 

We will continue same TPN rate and content and we will evaluate p.r.n. or in 
a.m.

 

 

 

 

Esther Jay PA-C

DD:  10/03/2019 09:33:12

DT:  10/03/2019 11:06:54

Job #:  [id number]

PAVEL

## 2019-10-03 NOTE — PN
DATE OF SERVICE:  10/01/2019

 

The patient remained clinically stable overnight.  The upper GI x-ray did show continued

leak, although, it appears to be well controlled than previously and not spreading away from

the tract leading to the drains.  Plan to place one additional stent and get some additional

pressure over the area of the leak and then move the drains back somewhat so as to get a

little bit more distraction from the site of leak to facilitate closure.  Potential risks of

the procedure, primarily that of development of infection as a result of the drain

manipulation were reviewed with the patient.  Otherwise, continue TPN and try to maximize

his activity.

 

 

 

 

Elliott Joe MD

DD:  10/01/2019 07:15:51

DT:  10/03/2019 12:33:08

Job #:  [id number]

## 2019-10-04 NOTE — CRLCT
Indication:



Judi-en-Y gastric bypass leak follow-up



Technique:



CT abdomen without and with 50 cc Isovue-300 oral contrast



Comparison:



September 18, 2019. September 9, 2019.



Findings:



Esophageal stent is unchanged in position. Multiple anterior drainage 

catheters are also unchanged in position. The previously seen anterior 

fluid collection is essentially resolved with a small pocket of air 

remaining in this location as demonstrated on series 2, image 42. imaging 

obtained after the administration of oral contrast demonstrates a 

persistent leak. This extravasated contrast extends into the air-filled 

space which is being drained by the drainage catheters. 



Remainder of the GI tract is unremarkable. The unenhanced liver, spleen, 

pancreas, adrenal glands and kidneys remain unremarkable. Lung bases are 

clear.



Impression:



Persistent leak is demonstrated with extravasated oral contrast extending 

into the air pocket where a previous fluid collection was present. This 

area is being appropriately drained by catheters. No other significant 

findings or changes.



Please note that all CT scans at this facility use dose modulation, 

iterative reconstruction, and/or weight-based dosing when appropriate to 

reduce radiation dose to as low as reasonably achievable.



Dictated by Partha Diggs MD @ Oct  5 2019  4:42PM



Signed by Dr. Partha Diggs @ Oct  5 2019  5:00PM

## 2019-10-06 NOTE — OR
DATE OF PROCEDURE:  10/01/2019

 

SURGEON:  Elliott Joe MD

 

PREOPERATIVE DIAGNOSIS:  Persistent fistula at esophagojejunostomy.

 

POSTOPERATIVE DIAGNOSIS:  Persistent fistula at esophagojejunostomy.

 

OPERATIVE PROCEDURE:  Upper GI endoscopy with,

1. Injection of fibrin sealant into fistula tract (30834).

2. Placement of additional esophagojejunal anastomosis stent (41939).

 

ANESTHESIA:  General.

 

INDICATION FOR PROCEDURE:  The patient had an upper GI x-ray yesterday, which showed now a

somewhat more well-defined leak at the esophagojejunal anastomosis.  To help facilitate

closure of this, one additional stent will be placed __________ at the point of the leak to

provide some additional pressure at that level.  We will place some fibrin sealant into the

tract beginning at the level of the submucosal level of the bowel wall at that time as well.

Potential risks including bleeding and infection were reviewed, and the patient wishes to

proceed.

 

DETAILS OF PROCEDURE:  The patient was taken to the operating room and placed in a supine

position.  After general endotracheal anesthesia was induced, the upper GI endoscope was

passed orally through the length of the esophagus and into the area of the esophagojejunal

anastomosis.  The scope was then passed through the existing stents into the jejunum, which

showed no signs of infection or other overt problems.  The point of the leak was then

identified based on upper GI x-ray and a Usha clamp placed over that area using fluoroscopy

to facilitate adequate placement of the second stent.  Underneath the proximal end of the

stent, there was an opening between the wall of esophagus and the upper edge of the stent

and a catheter was then manipulated from there into the level of the leak as well and 10 mL

of fibrin sealant injected.  This appeared to most likely be going into the area of the

submucosa around the leaks and from there into the cavity adjacent to the leak.  Following

this, an EndoMAXX covered stent was then deployed with the distal neck being at the level of

the leak and the proximal neck of this being somewhat higher in esophagus.  Good placement

of the stent appeared to be evident by fluoroscopy.  The area was then reexamined with the

scope, which confirmed this, and procedure concluded.  The patient was taken to the recovery

room in satisfactory condition.

 

 

 

 

Elliott Joe MD

DD:  10/05/2019 18:19:57

DT:  10/06/2019 12:19:11

Job #:  [id number]

## 2019-10-07 NOTE — PN
DATE OF SERVICE:  10/07/2019

 

SUBJECTIVE:  Morteza had Ambien 5 mg and 2 hours later had another 5.  He slept very well,

feels rested this morning.  He is quite talkative, and Dr. Joe discussed going home with

24-hour TPN therapy.  He did have a friend stop up who also is his caretaker, who is with

him 5 hours a day, but she will also take him home for a couple of days.  Vital signs have

been stable.  He has been up ambulating.  He has made it to the OB department a couple of

times.  He did refuse some of his medication again yesterday.  Vital signs, afebrile.  Oral

intake 520.  Urine output 2325, left drainage bag 40 and right drainage bag 100, and last

bowel movement was 10/06/2019.

 

REVIEW OF SYSTEMS:  Remainder of review of systems negative for any pertinent positives and

negatives.

 

OBJECTIVE:  GENERAL:  Morteza Campbell is a 59-year-old male.

VITAL SIGNS:  TPR is 98.1, 69, 16.  Blood pressure 173/92.  These vital signs were from

10/06/2019 at 1936.  He had not slept all weekend, so it was appropriate to let him sleep.

HEENT:  Negative.

NECK:  Supple.

HEART:  Regular rate and rhythm.

LUNGS:  Clear.

ABDOMEN:  Drainage into the drainage bags as above, light tan drainage.

EXTREMITIES:  Without peripheral edema.

 

ASSESSMENT:

1.  Fistulogram:

     a.  Injection of fibrin sealant at the site of leak.

     b.  Repositioning of drain for persistent fistula at the esophagojejunostomy

anastomosis.  Date, 10/05/2019.

2.      Upper endoscopy with:

         a.      Application of fibrin sealant into fistula.

         b.      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  Date 10/01/2019.

Surgeon, Elliott Joe MD.

3.      Exploratory laparotomy with:

     a.     Drainage of intraabdominal abscess located underneath anterior abdominal wall

             and intrahepatic left side of abdomen.

      b.     Drainage of separate left subphrenic abscess, for intraabdominal abscess.

              Date of surgery, 09/04/2019.  Surgeon, Elliott Joe M.D.

4.        Insertion of double lumen Gibson catheter via right subclavian vein approach.

5.       Dressing change under general anesthesia for indication of central vein access and

          open wound insufficiently clean, undergo delayed primary closure.

6.       EGD with esophageal stent placement on 09/17/2019.

7.       TPN therapy.

8.       Low hemoglobin requiring 1 unit of packed red blood cells on 09/09/2019,

09/10/2019, and 09/12/2019, for

          hemoglobins of 7.8, 7.8, and 8.6 respectively.

9.       Increased depression.

 

PLAN:

1. Teach abdominal dressing changes to patient for home care.

2. Set up home TPN for 24 hours.  May discharge to home when home health care and TPN

    therapy are set up.

3. We encouraged to take all home medications, which were prescribed prior to

    hospitalization.  Explained to the patient that, if his blood pressure would be too

    high or he had other complications due to not taking the medications, this would delay

    his discharge.

4. We will evaluate p.r.n. or in a.m.

 

 

 

 

Esther Jay PA-C

DD:  10/07/2019 07:12:48

DT:  10/07/2019 09:02:11

Job #:  [id number]

## 2019-10-07 NOTE — PN
DATE OF SERVICE:  10/05/2019

 

The patient has been afebrile with stable vital signs.  Mood is about the same.  He is

somewhat resistant to cares at times, but has been getting up and walking a little bit more.

The drains were manipulated today with injection of some Omnipaque into the drains.  Two of

the drains connect, as the Omnipaque came out the other drain when injected and ended up

getting a little bit into the lumen of the GI tract, but that appeared to be relatively

little.  We then put some fibrin sealant in through a catheter through the right-sided

drain, which will hopefully facilitate that area sealing up, and then all 3 drains were then

pulled back roughly 3/4 of an inch and sutured in place with a safety pin to suture this

point to allow ongoing manipulation of those drains as time goes by.  Otherwise, we will

continue to try to maximize his activity and administer the TPN for nutritional support.

 

 

 

 

Elliott Joe MD

DD:  10/05/2019 08:07:55

DT:  10/06/2019 15:17:41

Job #:  [id number]

## 2019-10-07 NOTE — PN
DATE OF SERVICE:  10/06/2019

 

The patient's alertness and activity level have distinctly improved.  He walked 3 times down

to the OB area, which is a round trip of probably around 200 feet and was in the chair quite

a bit.  This morning, he is more alert and communicative than he has been at any point in

the last 2 weeks or so.  At any rate, we will try some clear liquids today with some blue

dye added and then hold that if any of the blue dye appears in the drains.  Otherwise

continue the TPN and try to encourage maximal activity.  We will have Discharge Planning

look at the possibility of home TPN again tomorrow morning, as it would be very beneficial

for the patient to get out of the hospital from a mental status and mood standpoint.

 

 

 

 

Elliott Joe MD

DD:  10/06/2019 07:05:03

DT:  10/06/2019 15:27:45

Job #:  [id number]

## 2019-10-08 NOTE — PN
DATE OF SERVICE:  10/08/2019

 

SUBJECTIVE:  Morteza is planned to be discharged in the a.m.  He has been ambulating with

assistance.  Oral intake on clear liquids was up to 760.  He remains to have TPN.  PAMELA drains

put out 140 and 35.  He has no other questions or concerns.

 

OBJECTIVE:  GENERAL:  Morteza Campbell is a 59-year-old male.  He is alert and orientated.

VITAL SIGNS:  TPR 97.3, 67, 15.  Blood pressure is 155/76.

HEENT:  Negative.

NECK:  Supple.

HEART:  Regular rate and rhythm.

LUNGS:  Clear.

ABDOMEN:  Ostomy bags are over the PAMELA drains.  Abdominal dressing is over the open incision

which is closing well, and abdominal binder is on.

EXTREMITIES:  Without peripheral edema.

 

ASSESSMENT:

1.  Fistulogram:

     a.  Injection of fibrin sealant at the site of leak.

     b.  Repositioning of drain for persistent fistula at the esophagojejunostomy

anastomosis.  Date, 10/05/2019.

2.      Upper endoscopy with:

         a.      Application of fibrin sealant into fistula.

         b.      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  Date 10/01/2019.

Surgeon, Elliott Joe MD.

3.      Exploratory laparotomy with:

     a.     Drainage of intraabdominal abscess located underneath anterior abdominal wall

             and intrahepatic left side of abdomen.

      b.     Drainage of separate left subphrenic abscess, for intraabdominal abscess.

              Date of surgery, 09/04/2019.  Surgeon, Elliott Joe M.D.

4.        Insertion of double lumen Gibson catheter via right subclavian vein approach.

5.       Dressing change under general anesthesia for indication of central vein access and

          open wound insufficiently clean, undergo delayed primary closure.

6.       EGD with esophageal stent placement on 09/17/2019.

7.       TPN therapy.

8.       Low hemoglobin requiring 1 unit of packed red blood cells on 09/09/2019,

          09/10/2019, and 09/12/2019, fo hemoglobins of 7.8, 7.8, and 8.6 respectively.

9.       Increased depression.

 

PLAN:

1. Plan discharge in a.m. 10/09/2019.

2. Continue same TPN rate and content with lipids.

3. Check CBC, CMP, mag, and phos in a.m.

4. We will evaluate p.r.n. or in a.m.

 

 

 

 

Esther Jay PA-C

DD:  10/08/2019 09:19:15

DT:  10/08/2019 10:52:29

Job #:  [id number]

## 2019-10-08 NOTE — OR
DATE OF PROCEDURE:  09/04/2019

 

SURGEON:  Elliott Joe MD

 

PREOPERATIVE DIAGNOSIS:  Persistent fistula at esophagojejunostomy.

 

POSTOPERATIVE DIAGNOSIS:  Persistent fistula at esophagojejunostomy.

 

OPERATIVE PROCEDURE:  Fistulogram followed by:

1. Injection of fibrin sealant into end of drain site to facilitate sealing of the

    fistula.

2. Repositioning of drains to create an additional distraction from the drain and the

    fistula site.

 

ANESTHESIA:  IV sedation plus local.

 

INDICATION FOR PROCEDURE:  A 59-year-old male presenting with ongoing leak from the

esophagojejunal anastomosis.  The plan is to proceed with a fistulogram, followed by

injecting fibrin sealant in the area of the fistula and repositioning of the drains,

creating a little bit more distance from the drains and the fistula site.  The potential

risks including bleeding and infection were reviewed, and the patient wishes to proceed.

 

DETAILS OF PROCEDURE:  In the operating room, IV sedation was administered, after which,

initially through the right-sided Colin-Sweet drain, Renografin was injected with

fluoroscopic surveillance.  There was perhaps a little bit of dye getting into the area of

the esophagojejunal anastomosis, but most of this seemed to run out the other drain coming

in from the left side.  Given this, at this point, a flexible catheter was placed through

the right-sided drain down to the end of the drain and 4 mL of fibrin sealant was then

injected into that location.

 

Following this, all 3 drains were pulled back roughly 3/4 inch, using some local anesthetic

to help position them.  Two 3-0 Prolene sutures were placed.  These were left slightly

loose, so that a safety pin could be placed through the drain to help affix it in position

and allow gradual evacuation of the drain once the fistula was confirmed to be closed.

Dressing was applied.  The patient was taken to the recovery room in satisfactory condition.

 

 

 

 

Elliott Joe MD

DD:  10/07/2019 16:23:37

DT:  10/07/2019 23:42:36

Job #:  [id number]

## 2019-10-09 NOTE — DISCH
ADMISSION DIAGNOSES:  Nausea, vomiting, status post recent revision of Judi-en-Y gastric

bypass surgery, coronary artery disease, status post percutaneous coronary angioplasty,

depression, gastroesophageal reflux disease, hypertension, morbid obesity, status post

angioplasty with stent, and chronic back pain.

 

DISCHARGE DIAGNOSIS:

ASSESSMENT:

1.  Fistulogram:

     a.  Injection of fibrin sealant at the site of leak.

     b.  Repositioning of drain for persistent fistula at the esophagojejunostomy

anastomosis.  Date, 10/05/2019.

2.      Upper endoscopy with:

         a.      Application of fibrin sealant into fistula.

         b.      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  Date 10/01/2019.

Surgeon, Elliott Joe MD.

3.      Exploratory laparotomy with:

     a.     Drainage of intraabdominal abscess located underneath anterior abdominal wall

and intrahepatic left side of

             abdomen.

      b.     Drainage of separate left subphrenic abscess, for intraabdominal abscess.

              Date of surgery, 09/04/2019.  Surgeon, Elliott Joe M.D.

4.       Insertion of double lumen Gibson catheter via right subclavian vein approach.

5.       Dressing change under general anesthesia for indication of central vein access and

open wound insufficiently

          clean, undergo delayed primary closure.

6.       EGD with esophageal stent placement on 09/17/2019.

7.       TPN therapy.

8.       Low hemoglobin requiring 1 unit of packed red blood cells on 09/09/2019,

09/10/2019, and 09/12/2019, for

          hemoglobins of 7.8, 7.8, and 8.6 respectively.

9.       Increased depression.

 

 

HISTORY:  Morteza Campbell is a 59-year-old male who was discharged from the hospital after his

previous surgery.  He was discharged about 2 to 3 days.  He went to the nearest ER and was

transferred to [facility] with an esophageal leak.  He had his first surgery

on 09/04/2019.  He was started on TPN throughout his hospitalization.  There was a

persistent fistula at the esophagojejunostomy.  He has had stent placed as stated above.

Low hemoglobin, he did get 1 unit of packed red blood cells on 09/09/2019, 09/10/2019, and

09/12/2019 for hemoglobins of 7.8, 7.8, and 8.6 respectively.  With TPN 24 hours, he has 2

PAMELA drains on due to the persistent fistula at the esophagogastrojejunostomy anastomosis.

The drainage is maintained with 2 ostomy bags, one over each drain.  TPN and lipids are

infusing well.  He is tolerating it.  He has been walking in the [place].  Pain has been very

minimal.  He has not required pain medication.  He has been sleeping better.  Oral intake on

clear liquid was 840.  Urine output 1875.  The 2 PAMELA drains put out 40 and 40 respectively.

On last labs that were on 10/09/2019, white count 8.5, hemoglobin 9.6, sodium 136, potassium

3.9, creatinine 0.5, calcium 7.9, alkaline phosphatase 88, total protein 6.2, albumin 2.7,

and globulin 3.5.  Morteza has been ambulating in the [place].  He has been more independent.

Vital signs have been stable.  Oral intake and output adequate.  Last bowel movement

10/07/2019.  Able to be discharged to home with home health care today, 10/09/2019.

 

PHYSICAL EXAMINATION:

GENERAL:  Morteza is a 59-year-old male.  Height is 5 feet 8.11 inches, weight is 201 pounds.

VITAL SIGNS:  TPR 96.6, 66, 16.  Blood pressure 143/63.

HEENT:  Negative.

NECK:  Supple.

HEART:  Regular rate and rhythm.

LUNGS:  Clear.

ABDOMEN:  He has 2 PAMELA drains as stated.  Open incision is healing well, flush with the skin

and two 4x4s are placed over the open incision and secured with tape.  Abdominal binder has

been on.

EXTREMITIES:  Without peripheral edema.  Triple-lumen catheter is in right upper subclavian

area.

SKIN:  No rash noted.

 

DISPOSITION:  Discharge to home with home health care.

 

CONDITION:  Stable.

 

He will be getting home health care with [facility] for nursing, physical therapy,

occupational therapy, and home health need.  Continue TPN 24 hours daily, weekly triple-

lumen catheter dressing changes, step 1 gastric bypass diet.  Abdominal incision dressing

change with two 4x4s twice a day and p.r.n., empty clogged ostomy appliance which is used to

protect the skin from the drainage once a day, measure the amount of drainage and also the

color.  TPN and lipids managed by [facility].  Labs will be done in the clinic the first

week and home health care will be notified for followup labs if needed.  Followup

appointment with Elliott Joe MD, at [facility] on 10/16/2019 at

11:00 a.m., to come at 10:30 a.m. for lab work, CBC, CMP, magnesium, and phosphorus.

 

HOME MEDICATIONS:

1. Colace 100 mg oral twice daily.

2. Viscous lidocaine 5 mL p.r.n. esophageal pain, 100 mL given.

3. Melatonin 9 mg oral at bedtime p.r.n. sleep.

4. Zofran ODT 4 mg every 4 hours p.r.n. nausea, #30.

5. Scopolamine patch 1.5 mg transdermal every 72 hours, #3.

6. Zoloft 100 mg oral daily, #60 with 5 refills.

 

To resume home medications:

1. Coreg 25 mg twice daily.

2. Divalproex sodium 500 mg oral at bedtime.

3. Cardura 8 mg oral at bedtime.

4. Vasotec 40 mg oral twice daily.

5. Proscar 5 mg oral daily.

6. Neurontin 600 mg oral 3 times a day.

7. Lisinopril 20 mg oral daily.

8. Meclizine 12.5 mg oral daily p.r.n. dizziness.

9. Omeprazole 20 mg oral twice daily.

10.Ranitidine 150 mg oral twice daily.

11.Sucralfate/Carafate 1 g oral 4 times a day.

12.Amlodipine/Norvasc 5 mg oral daily.

13.Lipitor 40 mg oral at bedtime.

14.Hydroxyzine 50 mg oral every 4 hours p.r.n. pain.

15.Ropinirole/Requip XL 2 mg oral twice daily.

 

Discontinued Percocet, Celebrex, and potassium chloride.

 

DIET:  Step 1 gastric bypass diet (clear liquids).

 

ACTIVITY AFTER DISCHARGE:  No lifting over 10 pounds for 6 weeks.  Other activity, walk at

least 6 times daily inside your apartment.

 

DRIVING:  Do not drive for 1 week.

 

SHOWER/BATHING:  May shower.

 

DISCHARGE INSTRUCTIONS:  Notify provider if any fever, increased pain, nausea, or vomiting.

Keep site clean and dry.  Wear abdominal binder.

 

SPECIAL INSTRUCTION:

1. Change dressing twice a day, in the morning and evening.  Dressing changes consist of

    placing two 4 x 4 gauze pieces over incision and secured with tape.  One of the

    dressing changes should be after daily shower.

2. Ostomy appliances are placed over PAMELA drain tubings, each one should be measured every

    24 hours and document the amount and color of drainage.

3. Change ostomy appliances as needed.

4. Triple lumen catheter needs dressing changes once a week under sterile condition.

5. TPN and lipids will be running 24 hours a day.  See separate order sheet from [facility] who is managing the

    TPN.

6. Labs will be drawn at the clinic before appointment until further notice.

## 2019-10-13 NOTE — EDM.PDOC
ED HPI GENERAL MEDICAL PROBLEM





- General


Chief Complaint: Gastrointestinal Problem


Stated Complaint: VOMITTING WITH SOME BLOOD, PAIN


Time Seen by Provider: 10/13/19 18:00


Source of Information: Reports: Patient, Family


History Limitations: Reports: No Limitations





- History of Present Illness


INITIAL COMMENTS - FREE TEXT/NARRATIVE: 





59-year-old male who was discharged from the hospital on Wednesday after being 

in for 50 days due to abdominal surgeries. He does have home nursing, and they 

recommended he be seen today because he had nausea and vomiting this morning, 

was pale and weak. He was seen and worked up in [facility], numerous labs 

were done which were reassuring and he was given IV fluids. He improved and is 

feeling better but his nurse recommended he be seen here as well because this 

is where he had his surgeries. No fevers or chills, no shortness of breath.


Associated Symptoms: Reports: Cough, Nausea/Vomiting.  Denies: Confusion, Chest 

Pain, Fever/Chills





- Related Data


 Allergies











Allergy/AdvReac Type Severity Reaction Status Date / Time


 


cefaclor [From Ceclor] Allergy Mild Hives Verified 08/23/19 20:12


 


Penicillins Allergy Mild Hives Verified 08/23/19 20:12











Home Meds: 


 Home Meds





Carvedilol [Coreg] 25 mg PO BID 08/23/19 [History]


Doxazosin [Cardura] 8 mg PO BEDTIME 08/23/19 [History]


Enalapril Maleate [Vasotec] 40 mg PO BID 08/23/19 [History]


Finasteride [Proscar] 5 mg PO DAILY 08/23/19 [History]


Gabapentin [Neurontin] 600 mg PO TID 08/23/19 [History]


Meclizine [Antivert] 12.5 mg PO DAILY PRN 08/23/19 [History]


Omeprazole 20 mg PO BIDAC 08/23/19 [History]


Sucralfate 1 gm PO QID 08/23/19 [History]


amLODIPine [Norvasc] 5 mg PO DAILY 08/23/19 [History]


atorvaSTATin [Lipitor] 40 mg PO BEDTIME 08/23/19 [History]


Divalproex Sodium 500 mg PO BEDTIME 08/24/19 [History]


Ranitidine HCl [Ranitidine] 150 mg PO BID 08/24/19 [History]


rOPINIRole HCl [Requip Xl] 2 mg PO BID 08/24/19 [History]


hydrOXYzine HCl [hydrOXYzine] 50 mg PO Q4H PRN #42 tablet 08/29/19 [Rx]


Docusate Sodium [Colace] 100 mg PO BID #60 cap 10/09/19 [Rx]


Lidocaine 2% [Xylocaine 2% Viscous] 5 ml PO ASDIRECTED PRN #100 ml 10/09/19 [Rx]


Melatonin 9 mg PO BEDTIME PRN #30 tablet 10/09/19 [Rx]


Ondansetron [Zofran ODT] 4 mg PO Q4H PRN #30 tab.dis 10/09/19 [Rx]


Scopolamine [Transderm-Scop] 1.5 mg TRDERM Q72H #3 patch 10/09/19 [Rx]


Potassium Chloride [Klor-Con] 20 meq PO DAILY 10/13/19 [History]


oxyCODONE HCl/Acetaminophen [Oxycodone-Acetaminophen 5-325] 1 tab PO Q4H PRN 10/

13/19 [History]











Past Medical History


HEENT History: Reports: Impaired Vision


Cardiovascular History: Reports: Pacemaker, Stents, Other (See Below)


Other Cardiovascular History: bradycardia


Respiratory History: Reports: None


Gastrointestinal History: Reports: Bowel Obstruction, Diverticulosis, GERD, GI 

Bleed, Hemorrhoids, Helicobacter Pylori


Genitourinary History: Reports: BPH, UTI, Recurrent


Musculoskeletal History: Reports: Arthritis, Osteoarthritis


Neurological History: Reports: None


Psychiatric History: Reports: Mood Swings


Endocrine/Metabolic History: Reports: Obesity/BMI 30+


Other Endocrine/Metabolic History: states he has thyroid issues but does not 

remember what.


Dermatologic History: Reports: None





- Infectious Disease History


Infectious Disease History: Reports: None





- Past Surgical History


HEENT Surgical History: Reports: None


Cardiovascular Surgical History: Reports: Pacer


Respiratory Surgical History: Reports: None


GI Surgical History: Reports: Appendectomy, Bariatric Procedure, Cholecystectomy

, Small Bowel


Other GI Surgeries/Procedures: [place] Y 2017


Male  Surgical History: Reports: None


Endocrine Surgical History: Reports: None


Neurological Surgical History: Reports: Spinal Fusion, Other (See Below)


Other Neurological Surgeries/Procedures: back surgeries L4 and L5 are fused


Musculoskeletal Surgical History: Reports: Hip Replacement


Other Musculoskeletal Surgeries/Procedures:: on right side





Social & Family History





- Family History


Family Medical History: Noncontributory


Oncologic: Reports: Other (See Below)





- Tobacco Use


Smoking Status *Q: Never Smoker





- Caffeine Use


Caffeine Use: Reports: None





- Recreational Drug Use


Recreational Drug Use: No





ED ROS GENERAL





- Review of Systems


Review Of Systems: See Below


Constitutional: Reports: Malaise, Decreased Appetite.  Denies: Chills


HEENT: Reports: Throat Pain


Respiratory: Reports: Cough


Cardiovascular: Denies: Chest Pain


GI/Abdominal: Reports: Nausea, Vomiting.  Denies: Diarrhea


: Reports: No Symptoms


Skin: Reports: Pallor, Diaphoresis (Have pallor and diaphoresis this morning, 

seems to have resolved)


Neurological: Reports: Weakness





ED EXAM, GENERAL





- Physical Exam


Exam: See Below


Exam Limited By: No Limitations


General Appearance: Alert, No Apparent Distress


Eye Exam: Bilateral Eye: Normal Inspection (No jaundice)


Head: Atraumatic


Respiratory/Chest: No Respiratory Distress, Lungs Clear


Cardiovascular: Regular Rate, Rhythm.  No: Tachycardia


GI/Abdominal: Normal Bowel Sounds, Soft, Other (Colostomy bags are present and 

functioning)


Neurological: Alert, Oriented





Course





- Vital Signs


Last Recorded V/S: 


 Last Vital Signs











Temp  96.9 F   10/13/19 17:45


 


Pulse  70   10/13/19 17:45


 


Resp  16   10/13/19 17:45


 


BP  136/86   10/13/19 17:45


 


Pulse Ox  98   10/13/19 17:45














- Orders/Labs/Meds


Labs: 


 Laboratory Tests











  10/13/19 Range/Units





  18:04 


 


Urine Color  Yellow  (YELLOW)  


 


Urine Appearance  Clear  (CLEAR)  


 


Urine pH  6.0  (5.0-8.0)  


 


Ur Specific Gravity  1.025  (1.008-1.030)  


 


Urine Protein  Negative  (NEGATIVE)  mg/dL


 


Urine Glucose (UA)  Negative  (NEGATIVE)  mg/dL


 


Urine Ketones  Negative  (NEGATIVE)  mg/dL


 


Urine Occult Blood  Negative  (NEGATIVE)  


 


Urine Nitrite  Negative  (NEGATIVE)  


 


Urine Bilirubin  Negative  (NEGATIVE)  


 


Urine Urobilinogen  1.0  (0.2-1.0)  EU/dL


 


Ur Leukocyte Esterase  Negative  (NEGATIVE)  


 


Urine RBC  Not seen  (0-5)  


 


Urine WBC  0-5  (0-5)  


 


Ur Epithelial Cells  Rare  


 


Amorphous Sediment  Not seen  


 


Urine Bacteria  Few  


 


Urine Mucus  Few  











Meds: 


Medications














Discontinued Medications














Generic Name Dose Route Start Last Admin





  Trade Name Freq  PRN Reason Stop Dose Admin


 


Lidocaine HCl  15 ml  10/13/19 18:20  10/13/19 18:23





  Xylocaine 2% Viscous  PO  10/13/19 18:21  15 ml





  ONETIME ONE   Administration





     





     





     





     














- Re-Assessments/Exams


Free Text/Narrative Re-Assessment/Exam: 





10/13/19 18:44


Reviewed the labs from [facility] which were very reassuring. Discussed his 

condition with his home nurse and I don't feel he needs any further treatment 

at this time. A UA was negative. Patient will continue his regular medications, 

and he was given viscous lidocaine to use for throat pain on a when necessary 

basis. He will call [facility] tomorrow to update his condition and refill 

some of his chronic medications.





Departure





- Departure


Time of Disposition: 18:45


Disposition: Home, Self-Care 01


Clinical Impression: 


 Weakness





Nausea and vomiting


Qualifiers:


 Vomiting type: unspecified Vomiting Intractability: non-intractable Qualified 

Code(s): R11.2 - Nausea with vomiting, unspecified








- Discharge Information


Instructions:  Nausea, Adult


Referrals: 


PCP,None [Primary Care Provider] - 


Forms:  ED Department Discharge


Care Plan Goals: 


Continue your current medications, use viscous lidocaine as directed and fill 

prescription tomorrow if helpful. Call [facility] tomorrow with update if 

not improving satisfactorily. It also sounds like some medications need to be 

restarted and Esther can help you with that. Return sooner if worsening or you 

develop other concerns

## 2019-10-21 NOTE — CR
UGI w KUB

 

 

CLINICAL HISTORY: Anastomotic leak

 

FINDINGS: Previously seen esophageal stents have been removed. Patient swallowed

the small amount of water-soluble contrast under fluoroscopic observation.

Contrast filled the distal esophagus. There is a small outpouching laterally to

the left. This showed peristalsis during the exam and is likely some gastric

remanent the from previous surgery. Contrast passed into the proximal duodenum.

No definite the extravasation is identified. There is no contrast seen in the

drains.

 

5 minute delayed film showed a similar configuration

 

Impression: No leak is identified on the current upper GI. There is no evidence

of obstruction

## 2019-10-23 NOTE — OR
DATE OF PROCEDURE:  10/21/2019

 

SURGEON:  Elliott Joe MD

 

PREOPERATIVE DIAGNOSIS:  Status post endoscopic stent placement.

 

POSTOPERATIVE DIAGNOSIS:  Status post endoscopic stent placement.

 

OPERATIVE PROCEDURE:  Upper GI endoscopy with removal of endoscopic stents (x3) (15713).

 

ANESTHESIA:  General.

 

INDICATION FOR PROCEDURE:  The patient is status post endoscopic stent placement to help

facilitate closure of a leak from esophagojejunal anastomosis.  The leak clinically appears

to have stopped at this point.  He is to undergo an upper endoscopy and removal of the

stents.  Potential risks including bleeding, infection, possible re-emergence of the fistula

following the stent removal were gone over, and the patient wishes to proceed.

 

DETAILS OF PROCEDURE:  The patient was taken to the operating room and placed in a left

lateral decubitus position.  After general endotracheal anesthetic had been induced, the

upper GI endoscope was then passed orally through the length of the esophagus and into the

area of the stents.  There were 3 stents, which would overlap more or less like __________.

The more proximal of the stents was then grasped at the wire which causes it to pursestring

downward and pulled.  This was then pulled out and all 3 stents came out as a unit as they

were fairly well joined together.  The scope was then passed back into the esophagus and

from there through the esophagojejunal anastomosis.  The wall appeared to be intact at this

point with no evident leak.  The drains that he had draining did have no air going in.  The

patient was taken to the recovery room in satisfactory condition.

 

Of note, the patient subsequently had an upper GI x-ray, which again showed no leak.  So, he

will be started on a step-2 post-gastric bypass diet.  He will see me back on Wednesday to

begin moving the drains back.

 

 

 

 

Elliott Joe MD

DD:  10/23/2019 08:33:59

DT:  10/23/2019 15:21:45

Job #:  [id number]

## 2019-11-08 NOTE — OR
DATE OF PROCEDURE:  10/29/2019

 

SURGEON:  Elliott Joe MD

 

PREOPERATIVE DIAGNOSIS:  Contaminated Gibson catheter.

 

POSTOPERATIVE DIAGNOSIS:  Contaminated Gibson catheter.

 

OPERATIVE PROCEDURES:

1. Removal of Gibson catheter (00621).

2. Placement of new double-lumen Gibson catheter via right subclavian vein approach

    (52612).

 

ANESTHESIA:  Local plus IV sedation.

 

INDICATION FOR PROCEDURE:  This is a 59-year-old presenting with requirement for some home

TPN.  He was taking a shower yesterday when the home care nurses noted that his Gibson

catheter had been uncapped.  Fortunately, the valve allowing in and outflow through the

catheter had been clamped, but the open end of the Gibson catheter clearly resulted in some

contamination, which would put the patient at significant risk for subsequent sepsis, should

the catheter become infected.  Given this, he is to undergo removal of Gibson catheter, and

we will replace this through a new puncture site, to avoid any overlap in terms of potential

contamination.  Potential risks of the procedure including bleeding, infection,

pneumohemothorax, possibility of the new catheter may become infected or occluded were gone

over, and the patient wishes to proceed.

 

DETAILS OF PROCEDURE:  The patient was taken to the operating room and placed in a supine

position.  IV sedation was administered, after which the upper chest and neck areas were

prepped and draped, including the pre-existing Gibson catheter.  Somewhat lateral to the

puncture site of the present Gibson catheter, the area was anesthetized with 1% lidocaine

mixed with Marcaine and a needle placed from there into the subclavian vein and, over that,

a guidewire manipulated from the subclavian vein into the superior vena cava.  The previous

Gibson catheter was on the right side as well, because the patient had a pacemaker on the

left side, and we however were using a different skin puncture site, as well as sequential

skin exit site, i.e. this was complete removal and replacement of Gibson catheter through a

separate location per se.  Following the replacement of the wire, some additional local was

injected and a stab wound 3 fingerbreadths inferior to the original skin puncture site was

made, beginning somewhat lateral to the present location of the catheter, and the Gibson

catheter was then tunneled between those 2 points with the cuff being placed just inside the

skin at the lower puncture site.  The catheter was then cut such that the tip would lie in

the area of the superior vena cava/right atrial junction, and a new catheter was then

deployed with the introducer and peel away catheter without difficulty.  Good in and outflow

was noted through the Gibson catheter, and it was flushed with heparinized saline.  The

skin puncture site at the original needle location was closed with a 5-0 Vicryl subdermal

stitch and Steri-Strip, and the catheter was sutured to skin with some 3-0 nylon stitch.

 

The old catheter was then dissected free from the surrounding soft tissues and then

retrieved without difficulty and dressing applied.  The patient was taken to the recovery

room in satisfactory condition.

 

 

 

 

Elliott Joe MD

DD:  11/06/2019 20:45:40

DT:  11/08/2019 11:08:00

Job #:  [id number]